# Patient Record
Sex: FEMALE | Race: ASIAN | NOT HISPANIC OR LATINO | URBAN - METROPOLITAN AREA
[De-identification: names, ages, dates, MRNs, and addresses within clinical notes are randomized per-mention and may not be internally consistent; named-entity substitution may affect disease eponyms.]

---

## 2021-07-19 ENCOUNTER — INPATIENT (INPATIENT)
Facility: HOSPITAL | Age: 21
LOS: 4 days | Discharge: HOME | End: 2021-07-24
Attending: HOSPITALIST | Admitting: HOSPITALIST
Payer: COMMERCIAL

## 2021-07-19 VITALS
DIASTOLIC BLOOD PRESSURE: 52 MMHG | TEMPERATURE: 98 F | RESPIRATION RATE: 18 BRPM | SYSTOLIC BLOOD PRESSURE: 102 MMHG | HEART RATE: 88 BPM | OXYGEN SATURATION: 100 %

## 2021-07-19 LAB
ALBUMIN SERPL ELPH-MCNC: 5.3 G/DL — HIGH (ref 3.3–5)
ALP SERPL-CCNC: 67 U/L — SIGNIFICANT CHANGE UP (ref 40–120)
ALT FLD-CCNC: 9 U/L — SIGNIFICANT CHANGE UP (ref 4–33)
ANION GAP SERPL CALC-SCNC: 19 MMOL/L — HIGH (ref 7–14)
AST SERPL-CCNC: 16 U/L — SIGNIFICANT CHANGE UP (ref 4–32)
BASOPHILS # BLD AUTO: 0.01 K/UL — SIGNIFICANT CHANGE UP (ref 0–0.2)
BASOPHILS NFR BLD AUTO: 0.1 % — SIGNIFICANT CHANGE UP (ref 0–2)
BILIRUB SERPL-MCNC: 0.5 MG/DL — SIGNIFICANT CHANGE UP (ref 0.2–1.2)
BUN SERPL-MCNC: 11 MG/DL — SIGNIFICANT CHANGE UP (ref 7–23)
CALCIUM SERPL-MCNC: 10.1 MG/DL — SIGNIFICANT CHANGE UP (ref 8.4–10.5)
CHLORIDE SERPL-SCNC: 100 MMOL/L — SIGNIFICANT CHANGE UP (ref 98–107)
CO2 SERPL-SCNC: 23 MMOL/L — SIGNIFICANT CHANGE UP (ref 22–31)
CREAT SERPL-MCNC: 0.58 MG/DL — SIGNIFICANT CHANGE UP (ref 0.5–1.3)
EOSINOPHIL # BLD AUTO: 0.02 K/UL — SIGNIFICANT CHANGE UP (ref 0–0.5)
EOSINOPHIL NFR BLD AUTO: 0.3 % — SIGNIFICANT CHANGE UP (ref 0–6)
GLUCOSE SERPL-MCNC: 95 MG/DL — SIGNIFICANT CHANGE UP (ref 70–99)
HCT VFR BLD CALC: 41.6 % — SIGNIFICANT CHANGE UP (ref 34.5–45)
HGB BLD-MCNC: 12.7 G/DL — SIGNIFICANT CHANGE UP (ref 11.5–15.5)
IANC: 3.96 K/UL — SIGNIFICANT CHANGE UP (ref 1.5–8.5)
IMM GRANULOCYTES NFR BLD AUTO: 0.3 % — SIGNIFICANT CHANGE UP (ref 0–1.5)
LYMPHOCYTES # BLD AUTO: 2.79 K/UL — SIGNIFICANT CHANGE UP (ref 1–3.3)
LYMPHOCYTES # BLD AUTO: 37.7 % — SIGNIFICANT CHANGE UP (ref 13–44)
MCHC RBC-ENTMCNC: 27.5 PG — SIGNIFICANT CHANGE UP (ref 27–34)
MCHC RBC-ENTMCNC: 30.5 GM/DL — LOW (ref 32–36)
MCV RBC AUTO: 90.2 FL — SIGNIFICANT CHANGE UP (ref 80–100)
MONOCYTES # BLD AUTO: 0.6 K/UL — SIGNIFICANT CHANGE UP (ref 0–0.9)
MONOCYTES NFR BLD AUTO: 8.1 % — SIGNIFICANT CHANGE UP (ref 2–14)
NEUTROPHILS # BLD AUTO: 3.96 K/UL — SIGNIFICANT CHANGE UP (ref 1.8–7.4)
NEUTROPHILS NFR BLD AUTO: 53.5 % — SIGNIFICANT CHANGE UP (ref 43–77)
NRBC # BLD: 0 /100 WBCS — SIGNIFICANT CHANGE UP
NRBC # FLD: 0 K/UL — SIGNIFICANT CHANGE UP
PLATELET # BLD AUTO: 267 K/UL — SIGNIFICANT CHANGE UP (ref 150–400)
POTASSIUM SERPL-MCNC: 3.9 MMOL/L — SIGNIFICANT CHANGE UP (ref 3.5–5.3)
POTASSIUM SERPL-SCNC: 3.9 MMOL/L — SIGNIFICANT CHANGE UP (ref 3.5–5.3)
PROT SERPL-MCNC: 8.2 G/DL — SIGNIFICANT CHANGE UP (ref 6–8.3)
RBC # BLD: 4.61 M/UL — SIGNIFICANT CHANGE UP (ref 3.8–5.2)
RBC # FLD: 11.1 % — SIGNIFICANT CHANGE UP (ref 10.3–14.5)
SODIUM SERPL-SCNC: 142 MMOL/L — SIGNIFICANT CHANGE UP (ref 135–145)
WBC # BLD: 7.4 K/UL — SIGNIFICANT CHANGE UP (ref 3.8–10.5)
WBC # FLD AUTO: 7.4 K/UL — SIGNIFICANT CHANGE UP (ref 3.8–10.5)

## 2021-07-19 NOTE — ED PROVIDER NOTE - CLINICAL SUMMARY MEDICAL DECISION MAKING FREE TEXT BOX
21 y F w/ no PMHx presenting with inability to walk straight and lightheadedness for 3 days. +lower extremity weakness, lethargy, numbness of her finger tips. Denies room spinning, vomiting, blurry vision, headache, bowel/bladder incontinence. Vital signs stable. Physical exam positive for ataxic gait, mild dysmetria on finger to nose testing. DDX: Unlikely mass effect as patient has no signs of increased ICP (headache, vomiting, blurry vision). Post infectious (acute cerebellar ataxia, GBS, transverse myelitis) vs demyelinating disorder (MS) vs spinocerebellar ataxia vs POTS. Plan: non-con head CT, CBC, CMP, consult neurology -likely admission.

## 2021-07-19 NOTE — ED PROVIDER NOTE - NS ED ROS FT
Constitutional: No fever. no chills. no unexpected weight loss/gain  Eyes: No visual changes, eye pain or redness  HEENT: No throat pain, hearing changes, ear pain, nasal pain. No nose bleeding.  CV: No chest pain, no palpitations  Resp: No shortness of breath, no cough  GI: No abdominal pain. No nausea. no  vomiting. No diarrhea. No constipation.   : No dysuria, hematuria. no urinary frequency, no urinary urgency.  MSK: No musculoskeletal pain  Skin: No rash, lesions, no bruises  Neuro: No headache. + numbness or tingling. + weakness. No dizziness.  Allergy/Immunology: no allergy to medicine

## 2021-07-19 NOTE — ED PROVIDER NOTE - PROGRESS NOTE DETAILS
Mary-PGY3: pt received at sign-out, seen and evaluated at bedside.  Pt sitting comfortably in NAD. Discussed R/B/A of LP to evaluate for GBS/demyelinating disease, pt agreeable. LP performed without complication (see procedure note). Discussed with hospitalist, accepted for admission. Text page was sent to the MAR to inform them of the patient's admission. My call back number was included for any follow up questions.

## 2021-07-19 NOTE — ED ADULT NURSE NOTE - OBJECTIVE STATEMENT
Pt received in INT3. C/o dizziness since Saturday. States she woke up feeling something is off with weakness on b/l lower extremities. States currently dizziness have subsided but still feels her legs will give out once she starts walking. Denies PMH. A&ox4, ambulatory at baseline. Breathing even and unlabored. No facial droop or slurred speech noted. Pt has equal strength, motor, and sensation to bilateral upper and lower extremities. No drifts noted to bilateral upper and lower extremities. Denies nausea, vomiting or vision changes. 20G IV placed on left AC. Labs drawn and sent. Neuro at bedside for assessment. Waiting for CT. Will continue to monitor.

## 2021-07-19 NOTE — ED ADULT TRIAGE NOTE - CHIEF COMPLAINT QUOTE
Pt states she has been dizzy since Saturday. States she feels unsteady due to the dizziness. Denies PMH. States she went to urgent care who sent pt to the ED.

## 2021-07-19 NOTE — ED PROVIDER NOTE - OBJECTIVE STATEMENT
21 y F with no PMHx presenting with lightheadedness, fatigue and inability to walk for 3 days. Pt feels lightheadedness that is worse with standing. Denies room spinning sensation. Feels she can't walk in a straight line. No headache, trauma, LOC, vomiting, blurry vision or pain with eye movement. Endorses weakness in all extremities lower extremities > upper extremities. Numbness in her fingertips. No paresthesias.  No alcohol or nitrous oxide use. 21 y F with no PMHx presenting with lightheadedness, fatigue and inability to walk for 3 days. Pt feels lightheadedness that is worse with standing. Denies room spinning sensation. Feels she can't walk in a straight line. Numbness in her fingertips. No headache, trauma, LOC, vomiting, paresthesias, bowel/bladder incontinence, blurry vision or pain with eye movement. Endorses weakness in all extremities lower extremities > upper extremities. Drinks alcohol once a month, no nitrous oxide use. No recent illnesses. Covid vaccine in May.

## 2021-07-19 NOTE — ED PROVIDER NOTE - ATTENDING CONTRIBUTION TO CARE
20yo f no past medical history presents with difficulty walking, lightheadedness, fatigue, reported decreased sensation at finger/toe tips x 3 days. also reports legs  feel weaker relative to armsreports dry cough several weeks ago but otherwise no recent health issues. received 2nd covid vaccine in early may, no other recent meds/vaccination. no recent travel. no sexual activity in>1 yr and denies STD hx. denies drugs,etoh, tobacco, thc. reports normal BMs and urination.  denies trauma. exam as above.

## 2021-07-20 DIAGNOSIS — Z29.9 ENCOUNTER FOR PROPHYLACTIC MEASURES, UNSPECIFIED: ICD-10-CM

## 2021-07-20 DIAGNOSIS — R27.0 ATAXIA, UNSPECIFIED: ICD-10-CM

## 2021-07-20 LAB
ANION GAP SERPL CALC-SCNC: 15 MMOL/L — HIGH (ref 7–14)
APPEARANCE CSF: CLEAR — SIGNIFICANT CHANGE UP
APPEARANCE SPUN FLD: COLORLESS — SIGNIFICANT CHANGE UP
B PERT DNA SPEC QL NAA+PROBE: SIGNIFICANT CHANGE UP
BACTERIAL AG PNL SER: 0 % — SIGNIFICANT CHANGE UP
BASE EXCESS BLDV CALC-SCNC: 3.8 MMOL/L — HIGH (ref -3–2)
BLOOD GAS VENOUS - CREATININE: 0.6 MG/DL — SIGNIFICANT CHANGE UP (ref 0.5–1.3)
BLOOD GAS VENOUS COMPREHENSIVE RESULT: SIGNIFICANT CHANGE UP
BUN SERPL-MCNC: 11 MG/DL — SIGNIFICANT CHANGE UP (ref 7–23)
C PNEUM DNA SPEC QL NAA+PROBE: SIGNIFICANT CHANGE UP
CALCIUM SERPL-MCNC: 9.6 MG/DL — SIGNIFICANT CHANGE UP (ref 8.4–10.5)
CHLORIDE BLDV-SCNC: 105 MMOL/L — SIGNIFICANT CHANGE UP (ref 96–108)
CHLORIDE SERPL-SCNC: 100 MMOL/L — SIGNIFICANT CHANGE UP (ref 98–107)
CO2 SERPL-SCNC: 23 MMOL/L — SIGNIFICANT CHANGE UP (ref 22–31)
COLOR CSF: COLORLESS — SIGNIFICANT CHANGE UP
CREAT SERPL-MCNC: 0.6 MG/DL — SIGNIFICANT CHANGE UP (ref 0.5–1.3)
CSF PCR RESULT: SIGNIFICANT CHANGE UP
EOSINOPHIL # CSF: 2 % — SIGNIFICANT CHANGE UP
FLUAV SUBTYP SPEC NAA+PROBE: SIGNIFICANT CHANGE UP
FLUBV RNA SPEC QL NAA+PROBE: SIGNIFICANT CHANGE UP
GAS PNL BLDV: 140 MMOL/L — SIGNIFICANT CHANGE UP (ref 136–146)
GLUCOSE BLDV-MCNC: 90 MG/DL — SIGNIFICANT CHANGE UP (ref 70–99)
GLUCOSE CSF-MCNC: 59 MG/DL — SIGNIFICANT CHANGE UP (ref 40–70)
GLUCOSE SERPL-MCNC: 86 MG/DL — SIGNIFICANT CHANGE UP (ref 70–99)
GRAM STN FLD: SIGNIFICANT CHANGE UP
HADV DNA SPEC QL NAA+PROBE: SIGNIFICANT CHANGE UP
HCO3 BLDV-SCNC: 27 MMOL/L — SIGNIFICANT CHANGE UP (ref 20–27)
HCOV 229E RNA SPEC QL NAA+PROBE: SIGNIFICANT CHANGE UP
HCOV HKU1 RNA SPEC QL NAA+PROBE: SIGNIFICANT CHANGE UP
HCOV NL63 RNA SPEC QL NAA+PROBE: SIGNIFICANT CHANGE UP
HCOV OC43 RNA SPEC QL NAA+PROBE: SIGNIFICANT CHANGE UP
HCT VFR BLDA CALC: 35.7 % — SIGNIFICANT CHANGE UP (ref 34.5–46.5)
HGB BLD CALC-MCNC: 11.6 G/DL — SIGNIFICANT CHANGE UP (ref 11.5–15.5)
HMPV RNA SPEC QL NAA+PROBE: SIGNIFICANT CHANGE UP
HPIV1 RNA SPEC QL NAA+PROBE: SIGNIFICANT CHANGE UP
HPIV2 RNA SPEC QL NAA+PROBE: SIGNIFICANT CHANGE UP
HPIV3 RNA SPEC QL NAA+PROBE: SIGNIFICANT CHANGE UP
HPIV4 RNA SPEC QL NAA+PROBE: SIGNIFICANT CHANGE UP
LACTATE BLDV-MCNC: 1.5 MMOL/L — SIGNIFICANT CHANGE UP (ref 0.5–2)
LDH CSF L TO P-CCNC: 20 U/L — SIGNIFICANT CHANGE UP
LDH FLD-CCNC: 20 U/L — SIGNIFICANT CHANGE UP
LYMPHOCYTES # CSF: 74 % — SIGNIFICANT CHANGE UP
MONOS+MACROS NFR CSF: 18 % — SIGNIFICANT CHANGE UP
NEUTROPHILS # CSF: 6 % — SIGNIFICANT CHANGE UP
NRBC NFR CSF: 3 CELLS/UL — SIGNIFICANT CHANGE UP (ref 0–5)
OTHER CELLS CSF MANUAL: 0 % — SIGNIFICANT CHANGE UP
PCO2 BLDV: 50 MMHG — SIGNIFICANT CHANGE UP (ref 41–51)
PH BLDV: 7.38 — SIGNIFICANT CHANGE UP (ref 7.32–7.43)
PO2 BLDV: 38 MMHG — SIGNIFICANT CHANGE UP (ref 35–40)
POTASSIUM BLDV-SCNC: 4.3 MMOL/L — SIGNIFICANT CHANGE UP (ref 3.4–4.5)
POTASSIUM SERPL-MCNC: 3.9 MMOL/L — SIGNIFICANT CHANGE UP (ref 3.5–5.3)
POTASSIUM SERPL-SCNC: 3.9 MMOL/L — SIGNIFICANT CHANGE UP (ref 3.5–5.3)
PROT CSF-MCNC: 23 MG/DL — SIGNIFICANT CHANGE UP (ref 15–45)
RAPID RVP RESULT: SIGNIFICANT CHANGE UP
RBC # CSF: 1 CELLS/UL — HIGH (ref 0–0)
RSV RNA SPEC QL NAA+PROBE: SIGNIFICANT CHANGE UP
RV+EV RNA SPEC QL NAA+PROBE: SIGNIFICANT CHANGE UP
SAO2 % BLDV: 68.1 % — SIGNIFICANT CHANGE UP (ref 60–85)
SARS-COV-2 RNA SPEC QL NAA+PROBE: SIGNIFICANT CHANGE UP
SARS-COV-2 RNA SPEC QL NAA+PROBE: SIGNIFICANT CHANGE UP
SODIUM SERPL-SCNC: 138 MMOL/L — SIGNIFICANT CHANGE UP (ref 135–145)
SPECIMEN SOURCE: SIGNIFICANT CHANGE UP
TOTAL CELLS COUNTED, SPINAL FLUID: 50 CELLS — SIGNIFICANT CHANGE UP
TUBE TYPE: SIGNIFICANT CHANGE UP

## 2021-07-20 PROCEDURE — 99223 1ST HOSP IP/OBS HIGH 75: CPT

## 2021-07-20 PROCEDURE — 70450 CT HEAD/BRAIN W/O DYE: CPT | Mod: 26

## 2021-07-20 RX ORDER — DIPHENHYDRAMINE HCL 50 MG
25 CAPSULE ORAL EVERY 24 HOURS
Refills: 0 | Status: COMPLETED | OUTPATIENT
Start: 2021-07-20 | End: 2021-07-23

## 2021-07-20 RX ORDER — ACETAMINOPHEN 500 MG
650 TABLET ORAL EVERY 24 HOURS
Refills: 0 | Status: COMPLETED | OUTPATIENT
Start: 2021-07-20 | End: 2021-07-23

## 2021-07-20 RX ORDER — ENOXAPARIN SODIUM 100 MG/ML
40 INJECTION SUBCUTANEOUS DAILY
Refills: 0 | Status: DISCONTINUED | OUTPATIENT
Start: 2021-07-21 | End: 2021-07-24

## 2021-07-20 RX ORDER — IMMUNE GLOBULIN (HUMAN) 10 G/100ML
25 INJECTION INTRAVENOUS; SUBCUTANEOUS EVERY 24 HOURS
Refills: 0 | Status: COMPLETED | OUTPATIENT
Start: 2021-07-20 | End: 2021-07-23

## 2021-07-20 RX ADMIN — Medication 650 MILLIGRAM(S): at 13:42

## 2021-07-20 RX ADMIN — IMMUNE GLOBULIN (HUMAN) 62.5 GRAM(S): 10 INJECTION INTRAVENOUS; SUBCUTANEOUS at 14:36

## 2021-07-20 RX ADMIN — Medication 25 MILLIGRAM(S): at 13:42

## 2021-07-20 NOTE — H&P ADULT - NSHPOUTPATIENTPROVIDERS_GEN_ALL_CORE
Dr. Annika Arrington ([place], [facility])  Pharmacy: [facility] [phone] [facility]

## 2021-07-20 NOTE — CONSULT NOTE ADULT - ATTENDING COMMENTS
21 year RH female with no reported past medical history who is presenting with difficulty ambulating and LE weakness. On PE proximal LE weakness. 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

## 2021-07-20 NOTE — H&P ADULT - NSHPSOCIALHISTORY_GEN_ALL_CORE
Patient lives at home.  She is a student at [employer], currently doing an internship in Business/Finance.  Denies alcohol use, drugs, or tobacco products.

## 2021-07-20 NOTE — H&P ADULT - NSHPLABSRESULTS_GEN_ALL_CORE
12.7   7.40  )-----------( 267      ( 19 Jul 2021 23:13 )             41.6   07-19    142  |  100  |  11  ----------------------------<  95  3.9   |  23  |  0.58    Ca    10.1      19 Jul 2021 23:13    TPro  8.2  /  Alb  5.3<H>  /  TBili  0.5  /  DBili  x   /  AST  16  /  ALT  9   /  AlkPhos  67  07-19    CSF Protein 23, Glucose 59    Covid 19 PCR negative    CSF cell count: Total nucleated cell count = 3, 1 RBC, 2% eos, 74% lymphocytes, Total cell count 50, segmented neutrophils 6%, monocytes 18%    CT Head: No CT evidence of acute intracranial hemorrhage, brain edema, or mass effect.    EKG: Sinus rhythm with sinus arrhythmia @ 66bpm, QTc 406, no acute ischemic changes

## 2021-07-20 NOTE — H&P ADULT - ATTENDING COMMENTS
21 y.o. F with no significant PMH who presents with lightheadedness, fatigue, and unsteady gait. Patient with reduced strength 4/5 in hip flexors b/l. No fevers, chills, diarrhea. Patient does report dry cough x 2- 3 weeks -- will obtain RVP. No sick contacts or recent travel. Neuro evaluated in ED; concerned for GBS. LP obtained and  not consistent with albuminocytologic dissociation; however, neuro believes it is early in the course and hence why CSF studies do not yet demonstrate typical findings. Will initiate IVIG as per neuro.

## 2021-07-20 NOTE — H&P ADULT - MENTAL STATUS
Patient alert and oriented, awake. Normal affect. Follows all commands.   speech is fluent and clear

## 2021-07-20 NOTE — H&P ADULT - NEGATIVE NEUROLOGICAL SYMPTOMS
no transient paralysis/no generalized seizures/no focal seizures/no syncope/no tremors/no vertigo/no headache/no loss of consciousness/no hemiparesis/no confusion/no facial palsy

## 2021-07-20 NOTE — H&P ADULT - HISTORY OF PRESENT ILLNESS
21F no PMHx presenting with lightheadedness, fatigue, and unsteady gait for the past 3 days (since Saturday). Patient states she was was in her usual state of health and felt fine on Friday. When she woke up Saturday, she noticed she felt very tired, lethargic and dizzy when she stood up. It was difficult for her to walk straight without holding onto something for support. Her symptoms became worse yesterday, specifically with reduced sensation and strength in her lower extremities. She endorses sensation of numbness in her finger tips as well, and feels as though her arms are weaker than normal, but not as weak as her legs. She states that when she walks her dad has to hold her up and she sways back and forth and she feel as though she does not have the strength in her legs to support her weight. She noticed her legs shaking slightly yesterday. She admits to decreased appetite since yesterday as well with decreased PO intake. She denies changes in her hearing or vision, headache, nausea, vomiting, bowel or bladder incontinence. She denies CP, SOB, abdominal pain, constipation, diarrhea, difficulty urinating, dysuria, fever, chills, nasal congestion. She does endorse dry cough worse in the morning time x 2 weeks as well as sensation of abdominal bloating yesterday that has since resolved. In the ED, no meds were given. She was evaluated by neurology and LP was performed. Vital signs were stable.

## 2021-07-20 NOTE — CONSULT NOTE ADULT - SUBJECTIVE AND OBJECTIVE BOX
HPI:  Kylee Aldridge is a 21 year RH female with no reported past medical history who is presenting with difficulty ambulating and LE weakness. At baseline, patient ambulates without any assistive devices and is oriented to person/place/time. Patient stated that 2 days prior she started to have some LE weakness and difficulty walking. She reported lightheadedness when she stood up and difficulty walking without needing the assistance of holding on to items around her. She stated that she has not improved over the last 48 hours and has now presented to the ED for further evaluation. She endorsed that she felt that her thighs were what felt the weakest when she tried to stand up and also noted some changes in sensation to the tips of her fingertips. Denied any falls or head trauma. Denied headaches, vertigo, visual changes, diplopia, dysphagia, dysarthria, auditory changes. Denies any recent life stressors but notes she is stressed because she is concerned    REVIEW OF SYSTEMS    A 10-system ROS was performed and is negative except for those items noted above and/or in the HPI.    PAST MEDICAL & SURGICAL HISTORY:    FAMILY HISTORY:    SOCIAL HISTORY:   T/E/D:   Occupation:   Lives with:     MEDICATIONS (HOME):  Home Medications:    MEDICATIONS  (STANDING):    MEDICATIONS  (PRN):    ALLERGIES/INTOLERANCES:  Allergies  No Known Allergies    Intolerances    VITALS & EXAMINATION:  Vital Signs Last 24 Hrs  T(C): 36.5 (19 Jul 2021 19:26), Max: 36.5 (19 Jul 2021 19:26)  T(F): 97.7 (19 Jul 2021 19:26), Max: 97.7 (19 Jul 2021 19:26)  HR: 88 (19 Jul 2021 19:26) (88 - 88)  BP: 102/52 (19 Jul 2021 19:26) (102/52 - 102/52)  BP(mean): --  RR: 18 (19 Jul 2021 19:26) (18 - 18)  SpO2: 100% (19 Jul 2021 19:26) (100% - 100%)    General:  Constitutional: Obese Female, appears stated age, in no apparent distress including pain  Head: Normocephalic & atraumatic.  ENT: Patent ear canals, intact TM, mucus membranes moist & pink, neck supple, no lymphadenopathy.   Respiratory: Patent airway. All lung fields are clear to auscultation bilaterally.  Extremities: No cyanosis, clubbing, or edema.  Skin: No rashes, bruising, or discoloration.    Cardiovascular (>2): RRR no murmurs. Carotid pulsations symmetric, no bruits. Normal capillary beds refill, 1-2 seconds or less.     Neurological (>12):  MS: Awake, alert, oriented to person, place, situation, time. Normal affect. Follows all commands.    Language: Speech is clear, fluent with good repetition & comprehension (able to name objects___)    CNs: PERRLA (R = 3mm, L = 3mm). VFF. EOMI no nystagmus, no diplopia. V1-3 intact to LT/pinprick, well developed masseter muscles b/l. No facial asymmetry b/l, full eye closure strength b/l. Hearing grossly normal (rubbing fingers) b/l. Symmetric palate elevation in midline. Gag reflex deferred. Head turning & shoulder shrug intact b/l. Tongue midline, normal movements, no atrophy.    Fundoscopic: pale w/ sharp discs margins No vascular changes.      Motor: Normal muscle bulk & tone. No noticeable tremor or seizure. No pronator drift.              Deltoid	Biceps	Triceps	Wrist	Finger ABd	   R	5	5	5	5	5		5 	  L	5	5	5	5	5		5    	H-Flex	H-Ext	H-ABd	H-ADd	K-Flex	K-Ext	D-Flex	P-Flex  R	5	5	5	5	5	5	5	5 	   L	5	5	5	5	5	5	5	5	     Sensation: Intact to LT/PP/Temp/Vibration/Position b/l throughout.     Cortical: Extinction on DSS (neglect): none    Reflexes:              Biceps(C5)       BR(C6)     Triceps(C7)               Patellar(L4)    Achilles(S1)    Plantar Resp  R	2	          2	             2		        2		    2		Down   L	2	          2	             2		        2		    2		Down     Coordination: intact rapid-alt movements. No dysmetria to FTN/HTS    Gait: Normal Romberg. No postural instability. Normal stance and tandem gait.     LABORATORY:  CBC                       12.7   7.40  )-----------( 267      ( 19 Jul 2021 23:13 )             41.6     Chem 07-19    142  |  100  |  11  ----------------------------<  95  3.9   |  23  |  0.58    Ca    10.1      19 Jul 2021 23:13    TPro  8.2  /  Alb  5.3<H>  /  TBili  0.5  /  DBili  x   /  AST  16  /  ALT  9   /  AlkPhos  67  07-19    LFTs LIVER FUNCTIONS - ( 19 Jul 2021 23:13 )  Alb: 5.3 g/dL / Pro: 8.2 g/dL / ALK PHOS: 67 U/L / ALT: 9 U/L / AST: 16 U/L / GGT: x           Coagulopathy   Lipid Panel   A1c   Cardiac enzymes     U/A   CSF  Immunological  Other    STUDIES & IMAGING:  Studies (EKG, EEG, EMG, etc):     Radiology (XR, CT, MR, U/S, TTE/NASIMA): HPI:  Kylee Aldridge is a 21 year RH female with no reported past medical history who is presenting with difficulty ambulating and LE weakness. At baseline, patient ambulates without any assistive devices and is oriented to person/place/time. Patient stated that 2 days prior she started to have some LE weakness and difficulty walking. She reported lightheadedness when she stood up and difficulty walking without needing the assistance of holding on to items around her. She stated that she has not improved over the last 48 hours and has now presented to the ED for further evaluation. She endorsed that she felt that her thighs were she felt the weakest when she tried to stand up and felt like they were shaking and also noted some changes in sensation to the tips of her fingertips. Denied any falls or head trauma. Denied headaches, vertigo, visual changes, diplopia, dysphagia, dysarthria, auditory changes. 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  No recent vaccinations, had last COVID vaccine at the end of May. States that she recently had a cough for about a week but denies productive cough, fevers, chills, sore throat, diarrhea.     REVIEW OF SYSTEMS    A 10-system ROS was performed and is negative except for those items noted above and/or in the HPI.    PAST MEDICAL & SURGICAL HISTORY:  No reported PMHx    FAMILY HISTORY:  No reported family history of neurological disorders.     SOCIAL HISTORY:   T/E/D: denies tobacco, illicit drugs. States has about 1 drink per month  Occupation: student  Lives with: family    ALLERGIES/INTOLERANCES:  Allergies  No Known Allergies    Intolerances    VITALS & EXAMINATION:  Vital Signs Last 24 Hrs  T(C): 36.5 (19 Jul 2021 19:26), Max: 36.5 (19 Jul 2021 19:26)  T(F): 97.7 (19 Jul 2021 19:26), Max: 97.7 (19 Jul 2021 19:26)  HR: 88 (19 Jul 2021 19:26) (88 - 88)  BP: 102/52 (19 Jul 2021 19:26) (102/52 - 102/52)  BP(mean): --  RR: 18 (19 Jul 2021 19:26) (18 - 18)  SpO2: 100% (19 Jul 2021 19:26) (100% - 100%)    General:  Constitutional: Appears stated age, very anxious and tearful.  Head: Normocephalic & atraumatic.    Neurological (>12):  MS: Awake, alert, oriented to person, place, situation, time. Normal affect. Follows all commands.    Language: Speech is clear, fluent with good repetition & comprehension. Able to count to 20 in one breath.     CNs: PERRLA (R = 3mm, L = 3mm). VF intact in all 4 quadrants. EOMI with brief fatigable nystagmus on R gaze. V1-3 intact to LT, well developed masseter muscles b/l. No facial asymmetry b/l, full eye closure strength b/l. Symmetric palate elevation in midline.  Shoulder shrug intact b/l. Tongue midline, normal movements, no atrophy.  No Cogan's lid twitch.     Motor: Normal muscle bulk & tone. No noticeable tremor or seizure. No pronator drift.               Deltoid	Biceps	Triceps	Wrist	   R	5	5	5	5	5			  L	5	5	5	5	5	    	H-Flex	H-Ext	K-Flex	K-Ext	D-Flex	P-Flex  R	4+	5	5	5	5	5		   L	4+	5	5	5	5	5		     Sensation: Intact to LT/PP/Vibration/Position b/l throughout. Does note subjective decreased sensation on the thighs bilaterally.     Reflexes:              Biceps(C5)       BR(C6)     Triceps(C7)               Patellar(L4)    Achilles(S1)      R	0	          0	             0		        0		    0		   L	0	          0	             0		        0		    0		    Coordination: intact rapid-alt movements. No overt dysmetria to FTN    Gait: Normal Romberg. Unstable when walking without overt ataxia. Very tearful when attempts to walk and requires to hold to items when walking. Takes 4-5 steps when turning.     LABORATORY:  CBC                       12.7   7.40  )-----------( 267      ( 19 Jul 2021 23:13 )             41.6     Chem 07-19    142  |  100  |  11  ----------------------------<  95  3.9   |  23  |  0.58    Ca    10.1      19 Jul 2021 23:13    TPro  8.2  /  Alb  5.3<H>  /  TBili  0.5  /  DBili  x   /  AST  16  /  ALT  9   /  AlkPhos  67  07-19    LFTs LIVER FUNCTIONS - ( 19 Jul 2021 23:13 )  Alb: 5.3 g/dL / Pro: 8.2 g/dL / ALK PHOS: 67 U/L / ALT: 9 U/L / AST: 16 U/L / GGT: x           STUDIES & IMAGING:    Radiology (XR, CT, MR, U/S, TTE/NASIMA):

## 2021-07-20 NOTE — H&P ADULT - PROBLEM SELECTOR PLAN 1
- s/p LP in ED with cell count not consistent with bacterial/infectious process  - no recent preceding signs or symptoms of acute infection, GI upset, or gastrointestinal illness by history   - Head CT negative for acute findings  - Neurology consultation appreciated  - Areflexia, LE weakness, subtle gait instability concerning for acute inflammatory demyelinating polyneuropathy such as Guillain-Barré syndrome, possible variant of Corado-Quan Syndrome   - Possible that myopathy in differential as well given proximal muscle weakness   - CSF protein 23 (normal) - discussed with neurology - not consistent with albuminocytologic dissociation  - Lyme studies pending (lab will try to add on tests as there is limited amount of specimen obtained).  - Oligoclonal bands added on however not enough specimen received per my discussion with   - f/u ganglioside antibodies and Gq1b (in process)  - Check MRI brain without contrast (expedited with MRI dept this AM)  - I discussed with neurology team this morning - they will round with attending and decide on starting treatment with IVIG today - will follow up recommendations.  - Check orthostatics given component of light headedness on standing - autonomic component?   - Check UTox, UA, b12, folate   - Bedrest for now given unsteady gait   - Monitor vital signs and pulse ox closely, frequent neuro checks, fall prevention precautions - s/p LP in ED with cell count not consistent with bacterial/infectious process  - no recent preceding signs or symptoms of acute infection, GI upset, or gastrointestinal illness by history   - Head CT negative for acute findings  - Neurology consultation appreciated  - Areflexia, LE weakness, subtle gait instability concerning for acute inflammatory demyelinating polyneuropathy such as Guillain-Barré syndrome, possible variant of Corado-Quan Syndrome   - CSF protein 23 (normal) - discussed with neurology - not consistent with albuminocytologic dissociation  - Lyme studies pending (lab will try to add on tests as there is limited amount of specimen obtained).  - Oligoclonal bands added on to CSF studies   - f/u ganglioside antibodies and Gq1b (in process)  - Check MRI brain without contrast (expedited with MRI dept this AM) as well as MRI LS spine w/contrast per neuro  - Per my discussion with neuro - neuro recommends starting treatment with IVIG 2g/kg over 4 days preceded by pre-medication with Tylenol 325mg and Benadryl 25mg po 30 minutes prior to administration.  - I discussed this treatment plan with patient and her father at bedside who are in agreement to proceed with treatment; I also discussed with dispensing pharmacy and neuro team who state that no consent is needed   - NIF q6 ordered for close monitoring as it is extremely important for pt with GBS to have monitor for sudden changes in respiratory status   - Check orthostatics given component of light headedness on standing - autonomic component?   - Check UTox, UA, b12, folate   - Bedrest for now given unsteady gait   - Monitor vital signs and pulse ox closely, frequent neuro checks, fall prevention precautions - s/p LP in ED with cell count not consistent with bacterial/infectious process  - no recent preceding signs or symptoms of acute infection, GI upset, or gastrointestinal illness by history   - Head CT negative for acute findings  - Neurology consultation appreciated  - Areflexia, LE weakness, subtle gait instability concerning for acute inflammatory demyelinating polyneuropathy such as Guillain-Barré syndrome, possible variant of Corado-Quan Syndrome   - CSF protein 23 (normal) - discussed with neurology - not consistent with albuminocytologic dissociation  - Lyme studies pending (lab will try to add on tests as there is limited amount of specimen obtained).  - Oligoclonal bands added on to CSF studies   - f/u ganglioside antibodies and Gq1b (in process)  - Check MRI brain without contrast (expedited with MRI dept this AM) as well as MRI LS spine w/contrast per neuro  - Per my discussion with neuro - neuro recommends starting treatment with IVIG 2g/kg over 4 days preceded by pre-medication with Tylenol 650mg and Benadryl 25mg po 30 minutes prior to administration.  - I discussed this treatment plan with patient and her father at bedside who are in agreement to proceed with treatment; I also discussed with dispensing pharmacy and neuro team who state that no consent is needed   - Dosing of IVIG order confirmed with pharmacy and neuro team   - NIF q6 ordered for close monitoring as it is extremely important for pt with GBS to have monitor for sudden changes in respiratory status   - Check orthostatics given component of light headedness on standing - autonomic component?   - Check UTox, UA, b12, folate   - Bedrest for now given unsteady gait   - Monitor vital signs and pulse ox closely, frequent neuro checks, fall prevention precautions

## 2021-07-20 NOTE — ED ADULT NURSE REASSESSMENT NOTE - NS ED NURSE REASSESS COMMENT FT1
Pt resting comfortably in bed, denies pain/ symptoms at this time, vital signs stable. No neuro deficit observed, no facial asymmetry, equal  strength bilaterally, no arm/leg drift, positive strength to all four extremities, and positive sensation, PERRLA.  awaiting bed, will reassess.

## 2021-07-20 NOTE — CONSULT NOTE ADULT - ASSESSMENT
Kylee Aldridge is a 21 year RH female with no reported past medical history who is presenting with difficulty ambulating and LE weakness.     Impression: areflexia, LE weakness, subtle gait instability concerning for an acute inflammatory demyelinating polyneuropathy, possible variant of Corado-Quan as symptoms are predominantly areflexia and ataxia but without an overt ophthalmoplegia. Can also consider a myopathy as she is complaining of predominantly proximal muscle problems but areflexia is unusual. Unlikely to be a myasthenia gravis presentation as patient without fatigable symptoms and areflexia would be unusual or MS, transverse myelitis as patient without upper motor neuron signs.     Recs:  [] head CT to rule out a cerebellar lesion  [] LP to test for glucose, protein, cell count, PCR, culture, gram stain, Lyme, oligoclonal bands  [] please send Ganglioside antibodies, Gq1b  [] MRI brain w/o contrast  [] will likely need IVIG 2g/kg over 5 days preceded by pre-medication with Tylenol 325mg and Benadryl 25mg 30 minutes prior to administration  [] PT/OT  [] fall precautions    Case to be discussed with neurology attending, Dr. King.  Kylee Aldridge is a 21 year RH female with no reported past medical history who is presenting with difficulty ambulating and LE weakness.     Impression: areflexia, LE weakness, subtle gait instability concerning for an acute inflammatory demyelinating polyneuropathy, possible variant of Corado-Quan as symptoms are predominantly areflexia and ataxia but without an overt ophthalmoplegia. Can also consider a myopathy as she is complaining of predominantly proximal muscle problems but areflexia is unusual. Unlikely to be a myasthenia gravis presentation as patient without fatigable symptoms and areflexia would be unusual or MS, transverse myelitis as patient without upper motor neuron signs.     Recs:  [] head CT to rule out a cerebellar lesion  [] LP to test for glucose, protein, cell count, PCR, culture, gram stain, Lyme, oligoclonal bands. Specifically checking for albuminocytologic dissociation.   [] please send Ganglioside antibodies, Gq1b  [] MRI brain w/o contrast  [] will likely need IVIG 2g/kg over 5 days preceded by pre-medication with Tylenol 325mg and Benadryl 25mg 30 minutes prior to administration  [] PT/OT  [] fall precautions    Case to be discussed with neurology attending, Dr. King.  Kylee Aldridge is a 21 year RH female with no reported past medical history who is presenting with difficulty ambulating and LE weakness.     Impression: areflexia, LE weakness, subtle gait instability concerning for an acute inflammatory demyelinating polyneuropathy, possible variant of Corado-Quan as symptoms are predominantly areflexia and ataxia but without an overt ophthalmoplegia. Can also consider a myopathy as she is complaining of predominantly proximal muscle problems but areflexia is unusual. Unlikely to be a myasthenia gravis presentation as patient without fatigable symptoms and areflexia would be unusual or MS, transverse myelitis as patient without upper motor neuron signs.     Recs:  [] head CT to rule out a cerebellar lesion  [] LP to test for glucose, protein, cell count, PCR, culture, gram stain, Lyme, oligoclonal bands. Specifically checking for albuminocytologic dissociation.   [] please send Ganglioside antibodies, Gq1b  [] MRI brain w/o contrast  [] will likely need IVIG 2g/kg over 5 days preceded by pre-medication with Tylenol 325mg and Benadryl 25mg 30 minutes prior to administration  [] please obtain NIF q6 as it is extremely important for pt with GBS to have monitor for sudden changes in respiratory status   [] PT/OT  [] fall precautions    Case to be discussed with neurology attending, Dr. King.  Kylee Aldridge is a 21 year RH female with no reported past medical history who is presenting with difficulty ambulating and LE weakness.     Impression: areflexia, LE weakness, subtle gait instability concerning for an acute inflammatory demyelinating polyneuropathy, possible variant of Corado-Quan as symptoms are predominantly areflexia and ataxia but without an overt ophthalmoplegia. Can also consider a myopathy as she is complaining of predominantly proximal muscle problems but areflexia is unusual. Unlikely to be a myasthenia gravis presentation as patient without fatigable symptoms and areflexia would be unusual or MS, transverse myelitis as patient without upper motor neuron signs.     Recs:  [] head CT to rule out a cerebellar lesion  [] LP to test for glucose, protein, cell count, PCR, culture, gram stain, Lyme, oligoclonal bands. Specifically checking for albuminocytologic dissociation.   [] please send Ganglioside antibodies, Gq1b  [] MRI brain w/o contrast  [] will likely need IVIG 2g/kg over 4 days ( 2*47.6 over 4 days = 24 g/day) preceded by pre-medication with Tylenol 325mg and Benadryl 25mg po 30 minutes prior to administration  [] please obtain NIF q6 as it is extremely important for pt with GBS to have monitor for sudden changes in respiratory status   [] PT/OT  [] fall precautions    Case to be discussed with neurology attending, Dr. King.  Kylee Aldridge is a 21 year RH female with no reported past medical history who is presenting with difficulty ambulating and LE weakness.     Impression: areflexia, LE weakness, subtle gait instability concerning for an acute inflammatory demyelinating polyneuropathy, possible variant of Corado-Quan as symptoms are predominantly areflexia and ataxia but without an overt ophthalmoplegia. Can also consider a myopathy as she is complaining of predominantly proximal muscle problems but areflexia is unusual. Unlikely to be a myasthenia gravis presentation as patient without fatigable symptoms and areflexia would be unusual or MS, transverse myelitis as patient without upper motor neuron signs.     Recs:  [] head CT to rule out a cerebellar lesion  [] LP to test for glucose, protein, cell count, PCR, culture, gram stain, Lyme, oligoclonal bands. Specifically checking for albuminocytologic dissociation.   [] please send Ganglioside antibodies, Gq1b  [] will likely need IVIG 2g/kg over 4 days  preceded by pre-medication with Tylenol 325mg and Benadryl 25mg po 30 minutes prior to administration  [] please obtain NIF q6 as it is extremely important for pt with GBS to have monitor for sudden changes in respiratory status   [] PT/OT  [] fall precautions    Case to be discussed with neurology attending, Dr. King.

## 2021-07-20 NOTE — H&P ADULT - PROBLEM SELECTOR PLAN 2
- DVT ppx - SCDs for now given bedrest and recent lumbar puncture   - Case and plan to be discussed with attending - DVT ppx - SCDs for now given bedrest and recent lumbar puncture   - Case and plan discussed with Dr. Hearn

## 2021-07-21 LAB
ANION GAP SERPL CALC-SCNC: 15 MMOL/L — HIGH (ref 7–14)
BUN SERPL-MCNC: 12 MG/DL — SIGNIFICANT CHANGE UP (ref 7–23)
CALCIUM SERPL-MCNC: 9.7 MG/DL — SIGNIFICANT CHANGE UP (ref 8.4–10.5)
CHLORIDE SERPL-SCNC: 100 MMOL/L — SIGNIFICANT CHANGE UP (ref 98–107)
CO2 SERPL-SCNC: 23 MMOL/L — SIGNIFICANT CHANGE UP (ref 22–31)
COVID-19 SPIKE DOMAIN AB INTERP: POSITIVE
COVID-19 SPIKE DOMAIN ANTIBODY RESULT: >250 U/ML — HIGH
CREAT SERPL-MCNC: 0.6 MG/DL — SIGNIFICANT CHANGE UP (ref 0.5–1.3)
GLUCOSE SERPL-MCNC: 106 MG/DL — HIGH (ref 70–99)
HCT VFR BLD CALC: 40.2 % — SIGNIFICANT CHANGE UP (ref 34.5–45)
HGB BLD-MCNC: 12.2 G/DL — SIGNIFICANT CHANGE UP (ref 11.5–15.5)
MAGNESIUM SERPL-MCNC: 2.3 MG/DL — SIGNIFICANT CHANGE UP (ref 1.6–2.6)
MCHC RBC-ENTMCNC: 27.9 PG — SIGNIFICANT CHANGE UP (ref 27–34)
MCHC RBC-ENTMCNC: 30.3 GM/DL — LOW (ref 32–36)
MCV RBC AUTO: 91.8 FL — SIGNIFICANT CHANGE UP (ref 80–100)
NRBC # BLD: 0 /100 WBCS — SIGNIFICANT CHANGE UP
NRBC # FLD: 0 K/UL — SIGNIFICANT CHANGE UP
PHOSPHATE SERPL-MCNC: 4.7 MG/DL — HIGH (ref 2.5–4.5)
PLATELET # BLD AUTO: 216 K/UL — SIGNIFICANT CHANGE UP (ref 150–400)
POTASSIUM SERPL-MCNC: 4.2 MMOL/L — SIGNIFICANT CHANGE UP (ref 3.5–5.3)
POTASSIUM SERPL-SCNC: 4.2 MMOL/L — SIGNIFICANT CHANGE UP (ref 3.5–5.3)
RBC # BLD: 4.38 M/UL — SIGNIFICANT CHANGE UP (ref 3.8–5.2)
RBC # FLD: 11 % — SIGNIFICANT CHANGE UP (ref 10.3–14.5)
SARS-COV-2 IGG+IGM SERPL QL IA: >250 U/ML — HIGH
SARS-COV-2 IGG+IGM SERPL QL IA: POSITIVE
SODIUM SERPL-SCNC: 138 MMOL/L — SIGNIFICANT CHANGE UP (ref 135–145)
WBC # BLD: 3.8 K/UL — SIGNIFICANT CHANGE UP (ref 3.8–10.5)
WBC # FLD AUTO: 3.8 K/UL — SIGNIFICANT CHANGE UP (ref 3.8–10.5)

## 2021-07-21 PROCEDURE — 99233 SBSQ HOSP IP/OBS HIGH 50: CPT

## 2021-07-21 PROCEDURE — 72149 MRI LUMBAR SPINE W/DYE: CPT | Mod: 26

## 2021-07-21 PROCEDURE — 70551 MRI BRAIN STEM W/O DYE: CPT | Mod: 26

## 2021-07-21 RX ADMIN — IMMUNE GLOBULIN (HUMAN) 62.5 GRAM(S): 10 INJECTION INTRAVENOUS; SUBCUTANEOUS at 16:08

## 2021-07-21 RX ADMIN — Medication 650 MILLIGRAM(S): at 15:18

## 2021-07-21 RX ADMIN — Medication 25 MILLIGRAM(S): at 15:18

## 2021-07-21 RX ADMIN — ENOXAPARIN SODIUM 40 MILLIGRAM(S): 100 INJECTION SUBCUTANEOUS at 15:18

## 2021-07-21 NOTE — PROGRESS NOTE ADULT - NUTRITIONAL ASSESSMENT
21 year RH female with no reported past medical history who is presenting with difficulty ambulating and LE weakness. On PE proximal LE weakness 4at IPs, 4+ at knee extensors similar to slightly worse from yesterday arefelexia Decreased distal sensory in LEs to vibration     Impression: areflexia, LE weakness, gait instability concerning for GBS likely acute inflammatory demyelinating polyneuropathy    Recs:  LP grossly normal, often the case early in the course  Consider MRI LS spine w/con to assess for enhancement  recommend IVIG 2g/kg over 4 days  preceded by pre-medication with Tylenol 325mg and Benadryl 25mg po 30 minutes prior to administration  please obtain NIF q6  PT/OT  fall precautions . 21 year RH female with no reported past medical history who is presenting with difficulty ambulating and LE weakness. On PE proximal LE weakness 4at IPs, 4+ at knee extensors similar to slightly worse from yesterday arefelexia Decreased distal sensory in LEs to vibration     Impression: areflexia, LE weakness, gait instability concerning for GBS likely acute inflammatory demyelinating polyneuropathy    Recs:  LP grossly normal, often the case early in the course  MR BRain, LS spine non-revealing  recommend IVIG 2g/kg over 4 days  preceded by pre-medication with Tylenol 325mg and Benadryl 25mg po 30 minutes prior to administration  please obtain NIF q6  PT/OT  fall precautions .  Can follow up with Neurology, Dr. Sampson King at [phone] for an outpt EMG/NCS

## 2021-07-21 NOTE — PROGRESS NOTE ADULT - SUBJECTIVE AND OBJECTIVE BOX
PROGRESS NOTE:     Michael Lerner  Hospitalist  Pager- [phone]    Patient is a 21y old  Female who presents with a chief complaint of Light headedness, fatigue, weakness, ataxia (21 Jul 2021 11:16)      SUBJECTIVE / OVERNIGHT EVENTS: pt seen and examined by me   Parents at bedside  C/o Ongoing weakness in LE, no significant improvement   Pt very upset about her condition. Scared with her situation not knowing the outcome     ADDITIONAL REVIEW OF SYSTEMS:    MEDICATIONS  (STANDING):  acetaminophen   Tablet .. 650 milliGRAM(s) Oral every 24 hours  diphenhydrAMINE 25 milliGRAM(s) Oral every 24 hours  enoxaparin Injectable 40 milliGRAM(s) SubCutaneous daily  immune   globulin 10% (GAMMAGARD) IVPB 25 Gram(s) IV Intermittent every 24 hours    MEDICATIONS  (PRN):      CAPILLARY BLOOD GLUCOSE        I&O's Summary      PHYSICAL EXAM:  Vital Signs Last 24 Hrs  T(C): 36.8 (21 Jul 2021 06:12), Max: 37.4 (20 Jul 2021 14:35)  T(F): 98.2 (21 Jul 2021 06:12), Max: 99.3 (20 Jul 2021 14:35)  HR: 60 (21 Jul 2021 06:12) (60 - 71)  BP: 110/80 (21 Jul 2021 06:12) (93/60 - 110/80)  BP(mean): --  RR: 16 (21 Jul 2021 06:12) (16 - 18)  SpO2: 98% (21 Jul 2021 06:12) (98% - 100%)    CONSTITUTIONAL: NAD, well-developed  RESPIRATORY: Normal respiratory effort; lungs are clear to auscultation bilaterally  CARDIOVASCULAR: Regular rate and rhythm, normal S1 and S2, no murmur/rub/gallop; No lower extremity edema; Peripheral pulses are 2+ bilaterally  ABDOMEN: Nontender to palpation, normoactive bowel sounds, no rebound/guarding; No hepatosplenomegaly  MUSCLOSKELETAL: no clubbing or cyanosis of digits; no joint swelling or tenderness to palpation  PSYCH: A+O to person, place, and time; affect appropriate  NEURO: AAOx 3, speech fluent ,sensation intact, BLE- strength 4/5      SKIN:    LABS:                        12.2   3.80  )-----------( 216      ( 21 Jul 2021 09:45 )             40.2     07-21    138  |  100  |  12  ----------------------------<  106<H>  4.2   |  23  |  0.60    Ca    9.7      21 Jul 2021 09:45  Phos  4.7     07-21  Mg     2.30     07-21    TPro  x   /  Alb  4094  /  TBili  x   /  DBili  x   /  AST  x   /  ALT  x   /  AlkPhos  x   07-20              Culture - CSF with Gram Stain (collected 20 Jul 2021 10:40)  Source: .CSF CSF  Gram Stain (20 Jul 2021 12:00):    No polymorphonuclear leukocytes seen    No organisms seen    by cytocentrifuge  Preliminary Report (21 Jul 2021 06:48):    No growth        RADIOLOGY & ADDITIONAL TESTS:  Results Reviewed:   Imaging Personally Reviewed:  Electrocardiogram Personally Reviewed:    COORDINATION OF CARE:  Care Discussed with Consultants/Other Providers [Y/N]:  Prior or Outpatient Records Reviewed [Y/N]:

## 2021-07-21 NOTE — PROGRESS NOTE ADULT - ASSESSMENT
Kylee Aldridge is a 21 y.o. R-Handed F with no reported past medical history who is presenting with difficulty ambulating and LE weakness. Neurological exam remains areflexic and slight worse strength in b/l LE upon knee flexion and extension. LP on 7/20 without signs of albuminocytologic dissociation. Unable to obtain accurate NIF due to poor effort. CTH on 7/20 was negative for any lesions.     Impression: areflexia, LE weakness, subtle gait instability concerning for an acute inflammatory demyelinating polyneuropathy, possible variant of Corado-Quan as symptoms are predominantly areflexia and ataxia but without an overt ophthalmoplegia. Can also consider a myopathy as she is complaining of predominantly proximal muscle problems but areflexia is unusual. Unlikely to be a myasthenia gravis presentation as patient without fatigable symptoms and areflexia would be unusual or MS, transverse myelitis as patient without upper motor neuron signs.     Recs:  [x] head CT   [x] LP to check for albuminocytologic dissociation  [] f/u Ganglioside antibodies, Gq1b  [] c/w IVIG 2g/kg over 4 days; completed 1/4; can pre-medication with Tylenol 325mg and Benadryl 25mg po 30 minutes prior to administration  [] c/w NIF q6, encouraged pt for participation  [] PT/OT  [] fall precautions    Discussed with neurology attending, Dr. King.

## 2021-07-21 NOTE — PROGRESS NOTE ADULT - SUBJECTIVE AND OBJECTIVE BOX
SUBJECTIVE/INTERVAL HISTORY:  - received 25 g IVIG x1 on 7/20 without any reaction  - respiratory therapist attempted NIF at ~3:30 AM and pt had poor effort due to sleepiness  - pt feels generalized weakness that is worse compared to 7/20      PAST MEDICAL & SURGICAL HISTORY:  No pertinent past medical history    No significant past surgical history      MEDICATIONS (HOME):  Home Medications:  Retin-A 0.1% topical cream: Apply topically to affected area once a day (at bedtime) (20 Jul 2021 07:57)    MEDICATIONS  (STANDING):  acetaminophen   Tablet .. 650 milliGRAM(s) Oral every 24 hours  diphenhydrAMINE 25 milliGRAM(s) Oral every 24 hours  enoxaparin Injectable 40 milliGRAM(s) SubCutaneous daily  immune   globulin 10% (GAMMAGARD) IVPB 25 Gram(s) IV Intermittent every 24 hours    MEDICATIONS  (PRN):    ALLERGIES/INTOLERANCES:  Allergies  No Known Drug Allergies  Pollen (Unknown)    Intolerances    VITALS & EXAMINATION:  Vital Signs Last 24 Hrs  T(C): 36.8 (21 Jul 2021 06:12), Max: 37.4 (20 Jul 2021 14:35)  T(F): 98.2 (21 Jul 2021 06:12), Max: 99.3 (20 Jul 2021 14:35)  HR: 60 (21 Jul 2021 06:12) (60 - 76)  BP: 110/80 (21 Jul 2021 06:12) (93/60 - 110/80)  BP(mean): --  RR: 16 (21 Jul 2021 06:12) (16 - 18)  SpO2: 98% (21 Jul 2021 06:12) (98% - 100%)    General:  Constitutional: normal weight Female, appears stated age, in no apparent distress including pain but feels generalized weakness  Head: Normocephalic & atraumatic.    Neurological (>12):  MS: Awake, alert, oriented to person, place, situation, time. Normal affect. Follows all commands.    Language: no dysarthria     Motor: Normal muscle bulk & tone. No noticeable tremor or seizure. Neck flexion strength normal              Deltoid	Biceps	Triceps	Wrist	Finger ABd	   R	5	5	5	5	5		4 	  L	5	5	5	5	5		4    	H-Flex	H-Ext	K-Flex	K-Ext	D-Flex	P-Flex  R	5	5	4	4	5	5		 	   L	5	5	4	4	5	5			     Sensation: Intact to light touch, no difference in sensation between R and L & UE and LE    Cortical: Extinction on DSS (neglect): none    Reflexes:              Biceps(C5)       BR(C6)     Triceps(C7)               Patellar(L4)    Achilles(S1)      R	0	          0	             0		        0		    0		   L	0	          0	             0		        0		    0	    Gait: deferred but noted to be able to walk to bathroom with one person assistance    LABORATORY:  CBC                       12.2   3.80  )-----------( 216      ( 21 Jul 2021 09:45 )             40.2     Chem 07-21    138  |  100  |  12  ----------------------------<  106<H>  4.2   |  23  |  0.60    Ca    9.7      21 Jul 2021 09:45  Phos  4.7     07-21  Mg     2.30     07-21    TPro  8.2  /  Alb  5.3<H>  /  TBili  0.5  /  DBili  x   /  AST  16  /  ALT  9   /  AlkPhos  67  07-19    LFTs LIVER FUNCTIONS - ( 19 Jul 2021 23:13 )  Alb: 5.3 g/dL / Pro: 8.2 g/dL / ALK PHOS: 67 U/L / ALT: 9 U/L / AST: 16 U/L / GGT: x           Culture - CSF with Gram Stain . (07.20.21 @ 10:40)   Gram Stain:   No polymorphonuclear leukocytes seen   No organisms seen     CSF PCR Result: [facility]    Cerebrospinal Fluid Cell Count-1 (07.20.21 @ 05:39)   Tube Type: Tube 1   Other Cells - Spinal Fluid: 0 %   Total Cells Counted, Spinal Fluid: 50 Cells   RBC Count - Spinal Fluid: 1: Red Cell count correlates with the number and proportion of cells on   cytospin preparation. cells/uL   CSF Color: Colorless   Eosinophil Count - Spinal Fluid: 2 %   CSF Appearance: Clear   CSF Lymphocytes: 74 %   CSF Monocytes/Macrophages: 18 %   CSF Segmented Neutrophils: 6 %   Appearance Spun: Colorless   Atypical Lymphocytes - CSF: 0 %   Total Nucleated Cell Count, CSF: 3 cells/uL   CSF LDH 20  CSF Protein 23      Radiology:  < from: CT Head No Cont (07.20.21 @ 03:42) >    FINDINGS:    No acute intracranial hemorrhage, mass effect, vasogenic edema, or evidence of acute territorial infarct. Evaluation of the posterior fossa is degraded by streak artifact.    The visualized paranasal sinuses are clear. The mastoid air cells and middle ear cavities are clear.    The globes are unremarkable.    The soft tissues of the scalp are unremarkable. The calvarium is intact.    IMPRESSION:    No CT evidence of acute intracranial hemorrhage, brain edema, or mass effect.    --- End of Report ---      < end of copied text >     SUBJECTIVE/INTERVAL HISTORY:  - received 25 g IVIG x1 on 7/20 without any reaction  - respiratory therapist attempted NIF at ~3:30 AM and pt had poor effort due to sleepiness  - pt feels generalized weakness that is worse compared to 7/20  - VSS otherwise, satting well on RA      PAST MEDICAL & SURGICAL HISTORY:  No pertinent past medical history    No significant past surgical history      MEDICATIONS (HOME):  Home Medications:  Retin-A 0.1% topical cream: Apply topically to affected area once a day (at bedtime) (20 Jul 2021 07:57)    MEDICATIONS  (STANDING):  acetaminophen   Tablet .. 650 milliGRAM(s) Oral every 24 hours  diphenhydrAMINE 25 milliGRAM(s) Oral every 24 hours  enoxaparin Injectable 40 milliGRAM(s) SubCutaneous daily  immune   globulin 10% (GAMMAGARD) IVPB 25 Gram(s) IV Intermittent every 24 hours    MEDICATIONS  (PRN):    ALLERGIES/INTOLERANCES:  Allergies  No Known Drug Allergies  Pollen (Unknown)    Intolerances    VITALS & EXAMINATION:  Vital Signs Last 24 Hrs  T(C): 36.8 (21 Jul 2021 06:12), Max: 37.4 (20 Jul 2021 14:35)  T(F): 98.2 (21 Jul 2021 06:12), Max: 99.3 (20 Jul 2021 14:35)  HR: 60 (21 Jul 2021 06:12) (60 - 76)  BP: 110/80 (21 Jul 2021 06:12) (93/60 - 110/80)  BP(mean): --  RR: 16 (21 Jul 2021 06:12) (16 - 18)  SpO2: 98% (21 Jul 2021 06:12) (98% - 100%)    General:  Constitutional: normal weight Female, appears stated age, in no apparent distress including pain but feels generalized weakness  Head: Normocephalic & atraumatic.    Neurological (>12):  MS: Awake, alert, oriented to person, place, situation, time. Normal affect. Follows all commands.    Language: no dysarthria     Motor: Normal muscle bulk & tone. No noticeable tremor or seizure. Neck flexion strength normal              Deltoid	Biceps	Triceps	Wrist	Finger ABd	   R	5	5	5	5	5		4 	  L	5	5	5	5	5		4    	H-Flex	H-Ext	K-Flex	K-Ext	D-Flex	P-Flex  R	5	5	4	4	5	5		 	   L	5	5	4	4	5	5			     Sensation: Intact to light touch, no difference in sensation between R and L & UE and LE    Cortical: Extinction on DSS (neglect): none    Reflexes:              Biceps(C5)       BR(C6)     Triceps(C7)               Patellar(L4)    Achilles(S1)      R	0	          0	             0		        0		    0		   L	0	          0	             0		        0		    0	    Gait: deferred but noted to be able to walk to bathroom with one person assistance    LABORATORY:  CBC                       12.2   3.80  )-----------( 216      ( 21 Jul 2021 09:45 )             40.2     Chem 07-21    138  |  100  |  12  ----------------------------<  106<H>  4.2   |  23  |  0.60    Ca    9.7      21 Jul 2021 09:45  Phos  4.7     07-21  Mg     2.30     07-21    TPro  8.2  /  Alb  5.3<H>  /  TBili  0.5  /  DBili  x   /  AST  16  /  ALT  9   /  AlkPhos  67  07-19    LFTs LIVER FUNCTIONS - ( 19 Jul 2021 23:13 )  Alb: 5.3 g/dL / Pro: 8.2 g/dL / ALK PHOS: 67 U/L / ALT: 9 U/L / AST: 16 U/L / GGT: x           Culture - CSF with Gram Stain . (07.20.21 @ 10:40)   Gram Stain:   No polymorphonuclear leukocytes seen   No organisms seen     CSF PCR Result: [facility]    Cerebrospinal Fluid Cell Count-1 (07.20.21 @ 05:39)   Tube Type: Tube 1   Other Cells - Spinal Fluid: 0 %   Total Cells Counted, Spinal Fluid: 50 Cells   RBC Count - Spinal Fluid: 1: Red Cell count correlates with the number and proportion of cells on   cytospin preparation. cells/uL   CSF Color: Colorless   Eosinophil Count - Spinal Fluid: 2 %   CSF Appearance: Clear   CSF Lymphocytes: 74 %   CSF Monocytes/Macrophages: 18 %   CSF Segmented Neutrophils: 6 %   Appearance Spun: Colorless   Atypical Lymphocytes - CSF: 0 %   Total Nucleated Cell Count, CSF: 3 cells/uL   CSF LDH 20  CSF Protein 23      Radiology:  < from: CT Head No Cont (07.20.21 @ 03:42) >    FINDINGS:    No acute intracranial hemorrhage, mass effect, vasogenic edema, or evidence of acute territorial infarct. Evaluation of the posterior fossa is degraded by streak artifact.    The visualized paranasal sinuses are clear. The mastoid air cells and middle ear cavities are clear.    The globes are unremarkable.    The soft tissues of the scalp are unremarkable. The calvarium is intact.    IMPRESSION:    No CT evidence of acute intracranial hemorrhage, brain edema, or mass effect.    --- End of Report ---      < end of copied text >

## 2021-07-21 NOTE — PROGRESS NOTE ADULT - PROBLEM SELECTOR PLAN 1
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  FU Lyme studies/ Oligoclonal bands   FU  ganglioside antibodies and Gq1b (in process)  NIF q6 ordered for close monitoring as it is extremely important for pt with GBS to have monitor for sudden changes in respiratory status   PT eval

## 2021-07-22 LAB
ANION GAP SERPL CALC-SCNC: 13 MMOL/L — SIGNIFICANT CHANGE UP (ref 7–14)
BASOPHILS # BLD AUTO: 0.01 K/UL — SIGNIFICANT CHANGE UP (ref 0–0.2)
BASOPHILS NFR BLD AUTO: 0.3 % — SIGNIFICANT CHANGE UP (ref 0–2)
BUN SERPL-MCNC: 15 MG/DL — SIGNIFICANT CHANGE UP (ref 7–23)
CALCIUM SERPL-MCNC: 9.1 MG/DL — SIGNIFICANT CHANGE UP (ref 8.4–10.5)
CHLORIDE SERPL-SCNC: 102 MMOL/L — SIGNIFICANT CHANGE UP (ref 98–107)
CO2 SERPL-SCNC: 23 MMOL/L — SIGNIFICANT CHANGE UP (ref 22–31)
CREAT SERPL-MCNC: 0.63 MG/DL — SIGNIFICANT CHANGE UP (ref 0.5–1.3)
EOSINOPHIL # BLD AUTO: 0.02 K/UL — SIGNIFICANT CHANGE UP (ref 0–0.5)
EOSINOPHIL NFR BLD AUTO: 0.7 % — SIGNIFICANT CHANGE UP (ref 0–6)
GLUCOSE SERPL-MCNC: 81 MG/DL — SIGNIFICANT CHANGE UP (ref 70–99)
HCT VFR BLD CALC: 34.2 % — LOW (ref 34.5–45)
HGB BLD-MCNC: 10.2 G/DL — LOW (ref 11.5–15.5)
IANC: 1.37 K/UL — LOW (ref 1.5–8.5)
IMM GRANULOCYTES NFR BLD AUTO: 0 % — SIGNIFICANT CHANGE UP (ref 0–1.5)
LYMPHOCYTES # BLD AUTO: 1.12 K/UL — SIGNIFICANT CHANGE UP (ref 1–3.3)
LYMPHOCYTES # BLD AUTO: 38.5 % — SIGNIFICANT CHANGE UP (ref 13–44)
MAGNESIUM SERPL-MCNC: 2.2 MG/DL — SIGNIFICANT CHANGE UP (ref 1.6–2.6)
MCHC RBC-ENTMCNC: 27.3 PG — SIGNIFICANT CHANGE UP (ref 27–34)
MCHC RBC-ENTMCNC: 29.8 GM/DL — LOW (ref 32–36)
MCV RBC AUTO: 91.7 FL — SIGNIFICANT CHANGE UP (ref 80–100)
MONOCYTES # BLD AUTO: 0.39 K/UL — SIGNIFICANT CHANGE UP (ref 0–0.9)
MONOCYTES NFR BLD AUTO: 13.4 % — SIGNIFICANT CHANGE UP (ref 2–14)
NEUTROPHILS # BLD AUTO: 1.37 K/UL — LOW (ref 1.8–7.4)
NEUTROPHILS NFR BLD AUTO: 47.1 % — SIGNIFICANT CHANGE UP (ref 43–77)
NRBC # BLD: 0 /100 WBCS — SIGNIFICANT CHANGE UP
NRBC # FLD: 0 K/UL — SIGNIFICANT CHANGE UP
PHOSPHATE SERPL-MCNC: 4.6 MG/DL — HIGH (ref 2.5–4.5)
PLATELET # BLD AUTO: 204 K/UL — SIGNIFICANT CHANGE UP (ref 150–400)
POTASSIUM SERPL-MCNC: 3.9 MMOL/L — SIGNIFICANT CHANGE UP (ref 3.5–5.3)
POTASSIUM SERPL-SCNC: 3.9 MMOL/L — SIGNIFICANT CHANGE UP (ref 3.5–5.3)
RBC # BLD: 3.73 M/UL — LOW (ref 3.8–5.2)
RBC # FLD: 11 % — SIGNIFICANT CHANGE UP (ref 10.3–14.5)
SODIUM SERPL-SCNC: 138 MMOL/L — SIGNIFICANT CHANGE UP (ref 135–145)
WBC # BLD: 2.91 K/UL — LOW (ref 3.8–10.5)
WBC # FLD AUTO: 2.91 K/UL — LOW (ref 3.8–10.5)

## 2021-07-22 PROCEDURE — 99222 1ST HOSP IP/OBS MODERATE 55: CPT

## 2021-07-22 PROCEDURE — 99233 SBSQ HOSP IP/OBS HIGH 50: CPT

## 2021-07-22 RX ORDER — ACETAMINOPHEN 500 MG
650 TABLET ORAL EVERY 6 HOURS
Refills: 0 | Status: DISCONTINUED | OUTPATIENT
Start: 2021-07-22 | End: 2021-07-24

## 2021-07-22 RX ADMIN — Medication 25 MILLIGRAM(S): at 12:42

## 2021-07-22 RX ADMIN — ENOXAPARIN SODIUM 40 MILLIGRAM(S): 100 INJECTION SUBCUTANEOUS at 12:42

## 2021-07-22 RX ADMIN — Medication 650 MILLIGRAM(S): at 12:42

## 2021-07-22 RX ADMIN — IMMUNE GLOBULIN (HUMAN) 62.5 GRAM(S): 10 INJECTION INTRAVENOUS; SUBCUTANEOUS at 13:30

## 2021-07-22 NOTE — PROGRESS NOTE ADULT - SUBJECTIVE AND OBJECTIVE BOX
SUBJECTIVE/INTERVAL HISTORY:  - ovn NIF maintained from -38 to -45 with good effort  - has headache with light headedness upon getting up from the bed and walking  - later afternoon planned to receive third IVIG tx  - weakness overall feels about the same and not getting worse compared to previous three days  - VSS otherwise, satting well on RA    PAST MEDICAL & SURGICAL HISTORY:  No pertinent past medical history    No significant past surgical history      MEDICATIONS (HOME):  Home Medications:  Retin-A 0.1% topical cream: Apply topically to affected area once a day (at bedtime) (20 Jul 2021 07:57)    MEDICATIONS  (STANDING):  acetaminophen   Tablet .. 650 milliGRAM(s) Oral every 24 hours  diphenhydrAMINE 25 milliGRAM(s) Oral every 24 hours  enoxaparin Injectable 40 milliGRAM(s) SubCutaneous daily  immune   globulin 10% (GAMMAGARD) IVPB 25 Gram(s) IV Intermittent every 24 hours    MEDICATIONS  (PRN):    ALLERGIES/INTOLERANCES:  Allergies  No Known Drug Allergies  Pollen (Unknown)    Intolerances    VITALS & EXAMINATION:  Vital Signs Last 24 Hrs  T(C): 36.6 (22 Jul 2021 04:19), Max: 36.9 (21 Jul 2021 16:25)  T(F): 97.8 (22 Jul 2021 04:19), Max: 98.5 (21 Jul 2021 16:25)  HR: 65 (22 Jul 2021 04:19) (59 - 73)  BP: 104/69 (22 Jul 2021 04:19) (94/57 - 108/63)  BP(mean): --  RR: 15 (22 Jul 2021 04:19) (14 - 16)  SpO2: 99% (22 Jul 2021 04:19) (99% - 100%)    General:  Constitutional: normal weight Female, appears stated age, in no apparent distress including pain but feels generalized weakness  Head: Normocephalic & atraumatic    Neurological (>12):  MS: Awake, alert, oriented to person, place, situation, time. Normal affect. Follows all commands.    Language: no dysarthria     Motor: Normal muscle bulk & tone. No noticeable tremor or seizure. Neck flexion strength normal              Deltoid	Biceps	Triceps	Wrist	Finger ABd	   R	5	5	5	5	5		4 	  L	5	5	5	5	5		4    	H-Flex	H-Ext	K-Flex	K-Ext	D-Flex	P-Flex  R	5	5	5	5	5	5		 	   L	5	5	5	5	5	5			     Sensation: Intact to light touch throughout extremities      Reflexes:              Biceps(C5)       BR(C6)     Triceps(C7)               Patellar(L4)    Achilles(S1)      R	0	          0	             0		        0		    0		   L	0	          1	             0		        0		    0	    Coordination: no ataxia for FTN test    Gait: deferred but noted to be able to walk to hallway with one person assistance    LABORATORY:  CBC                       10.2   2.91  )-----------( 204      ( 22 Jul 2021 07:08 )             34.2     Chem 07-22    138  |  102  |  15  ----------------------------<  81  3.9   |  23  |  0.63    Ca    9.1      22 Jul 2021 07:08  Phos  4.6     07-22  Mg     2.20     07-22    TPro  x   /  Alb  4094  /  TBili  x   /  DBili  x   /  AST  x   /  ALT  x   /  AlkPhos  x   07-20    LFTs LIVER FUNCTIONS - ( 20 Jul 2021 17:45 )  Alb: 4094 mg/dL / Pro: x     / ALK PHOS: x     / ALT: x     / AST: x     / GGT: x          SUBJECTIVE/INTERVAL HISTORY:  - ovn NIF maintained from -38 to -45 with good effort  - has headache with light headedness upon getting up from the bed and walking  - later afternoon planned to receive third IVIG tx  - weakness overall feels about the same and not getting worse compared to previous three days  - VSS otherwise, satting well on RA    PAST MEDICAL & SURGICAL HISTORY:  No pertinent past medical history    No significant past surgical history      MEDICATIONS (HOME):  Home Medications:  Retin-A 0.1% topical cream: Apply topically to affected area once a day (at bedtime) (20 Jul 2021 07:57)    MEDICATIONS  (STANDING):  acetaminophen   Tablet .. 650 milliGRAM(s) Oral every 24 hours  diphenhydrAMINE 25 milliGRAM(s) Oral every 24 hours  enoxaparin Injectable 40 milliGRAM(s) SubCutaneous daily  immune   globulin 10% (GAMMAGARD) IVPB 25 Gram(s) IV Intermittent every 24 hours    MEDICATIONS  (PRN):    ALLERGIES/INTOLERANCES:  Allergies  No Known Drug Allergies  Pollen (Unknown)    Intolerances    VITALS & EXAMINATION:  Vital Signs Last 24 Hrs  T(C): 36.6 (22 Jul 2021 04:19), Max: 36.9 (21 Jul 2021 16:25)  T(F): 97.8 (22 Jul 2021 04:19), Max: 98.5 (21 Jul 2021 16:25)  HR: 65 (22 Jul 2021 04:19) (59 - 73)  BP: 104/69 (22 Jul 2021 04:19) (94/57 - 108/63)  BP(mean): --  RR: 15 (22 Jul 2021 04:19) (14 - 16)  SpO2: 99% (22 Jul 2021 04:19) (99% - 100%)    General:  Constitutional: normal weight Female, appears stated age, in no apparent distress including pain but feels generalized weakness  Head: Normocephalic & atraumatic    Neurological (>12):  MS: Awake, alert, oriented to person, place, situation, time. Normal affect. Follows all commands.    Language: no dysarthria     Motor: Normal muscle bulk & tone. No noticeable tremor or seizure. Neck flexion strength normal              Deltoid	Biceps	Triceps	Wrist	Finger ABd	   R	5	5	5	5	5		4 	  L	5	5	5	5	5		4    	H-Flex	H-Ext	K-Flex	K-Ext	D-Flex	P-Flex  R	5	5	5	5	5	5		 	   L	5	5	5	5	5	5			     Sensation: Intact to light touch throughout extremities      Reflexes:              Biceps(C5)       BR(C6)     Triceps(C7)               Patellar(L4)    Achilles(S1)      R	0	          0	             0		        0		    0		   L	0	          1	             0		        0		    0	    Coordination: no ataxia for FTN test    Gait: deferred but noted to be able to walk to hallway with one person assistance    LABORATORY:  CBC                       10.2   2.91  )-----------( 204      ( 22 Jul 2021 07:08 )             34.2     Chem 07-22    138  |  102  |  15  ----------------------------<  81  3.9   |  23  |  0.63    Ca    9.1      22 Jul 2021 07:08  Phos  4.6     07-22  Mg     2.20     07-22    TPro  x   /  Alb  4094  /  TBili  x   /  DBili  x   /  AST  x   /  ALT  x   /  AlkPhos  x   07-20    LFTs LIVER FUNCTIONS - ( 20 Jul 2021 17:45 )  Alb: 4094 mg/dL / Pro: x     / ALK PHOS: x     / ALT: x     / AST: x     / GGT: x           < from: MR Head No Cont (07.21.21 @ 02:13) >  FINDINGS: The brain parenchyma is normal in signal and morphology. There is no evidence of acute ischemia on the diffusion-weighted images.    Ventricular size and configuration is unremarkable. No abnormal extra axial fluid collections are noted. Flow-voids are noted throughout the major intracranial vessels, on the T2 weighted images, consistent with their patency. The sella turcica and posterior fossa are unremarkable.    The paranasal sinuses and mastoid air cells are clear. Calvarial signal is within normal limits. The orbits appear unremarkable.    IMPRESSION: Unremarkable noncontrast brain MRI examination.      --- End of Report ---      < end of copied text >    < from: MR Lumbar Spine w/ IV Cont (07.21.21 @ 02:14) >  INTERPRETATION:  Clinical indication: Ataxia. Weakness.    MRI of the lumbar spine was performed using sagittal T1 and T2 and STIR sequence. Axial T1 and T2-weighted sequences were performed as well. The patient was injected with approximately 4.5 cc of Gadavist IV with 3 cc contrast discarded. Sagittal T1-weighted sequence performed fat suppression. Axial T1-weighted sequences were performed.    Loss of the normal lumbar lordosis is seen. This could be due to positioning or muscle spasm.    The vertebral body height alignment and marrow signal appear normal    The disc spaces appear preserved.    T12-L1: Normal    L1-2: Normal    L2-3: Normal    L3-4: Normal    L4-5: Normal    L5-S1: Normal    The conus ends at L2 and appears normal    Evaluation of the paraspinal soft tissues appear normal.    No abnormal areas enhancement seen.    IMPRESSION: Unremarkable contrast enhanced MR of the lumbar spine.    --- End of Report ---      < end of copied text >

## 2021-07-22 NOTE — PROGRESS NOTE ADULT - ASSESSMENT
21 y.o. R-Handed F with no reported past medical history who is presenting with difficulty ambulating and LE weakness. Neurological exam remains largely areflexic and improved strength in all extremities. LP on 7/20 without signs of albuminocytologic dissociation. Unable to obtain accurate NIF due to poor effort. CTH on 7/20 was negative for any lesions.     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 Tolerating IVIG tx with no acute worsening of neuromuscular or respiratory status    Recs:  [x] head CT   [x] LP to check for albuminocytologic dissociation  [] for headache, can try tylenol or motrin  [] f/u Ganglioside antibodies, Gq1b  [] c/w IVIG 2g/kg over 4 days; completed 2/4 treatments; can pre-medication with Tylenol 325mg and Benadryl 25mg po 30 minutes prior to administration  [] c/w NIF q6, encouraged pt for participation  [] PT/OT  [] fall precautions    Discussed with neurology attending, Dr. King.    21 y.o. R-Handed F with no reported past medical history who is presenting with difficulty ambulating and LE weakness. Neurological exam remains largely areflexic and improved strength in all extremities. LP on 7/20 without signs of albuminocytologic dissociation. Unable to obtain accurate NIF due to poor effort. CTH on 7/20 was negative for any lesions.     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 Tolerating IVIG tx with no acute worsening of neuromuscular or respiratory status    Recs:  [x] head CT   [x] LP to check for albuminocytologic dissociation  [] for headache, can try tylenol or motrin  [] obtain orthostatics for light headedness  [] f/u Ganglioside antibodies, Gq1b  [] c/w IVIG 2g/kg over 4 days; completed 2/4 treatments; can pre-medication with Tylenol 325mg and Benadryl 25mg po 30 minutes prior to administration  [] c/w NIF q6, encouraged pt for participation  [] c/w PT/OT  [] fall precautions  [] can f/u with Dr. King for further nerve conduction test within 2 weeks of discharge [phone]. [address] [place] [address]      Discussed with neurology attending, Dr. King.    21 y.o. R-Handed F with no reported past medical history who is presenting with difficulty ambulating and LE weakness. Neurological exam remains largely areflexic and improved strength in all extremities. LP on 7/20 without signs of albuminocytologic dissociation. Unable to obtain accurate NIF due to poor effort. CTH on 7/20 was negative for any lesions. MR brain wo contrast and lumbar spine w iv contrast negative for any acute findings but did note loss of normal lumbar lordosis due to positioning or muscle spasm.     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 Tolerating IVIG tx with no acute worsening of neuromuscular or respiratory status    Recs:  [x] CT brain, MR brain, MR lumbar spine  [x] LP to check for albuminocytologic dissociation  [] for headache, can try tylenol or motrin  [] obtain orthostatics for light headedness  [] f/u Ganglioside antibodies, Gq1b  [] c/w IVIG 2g/kg over 4 days; completed 2/4 treatments; can pre-medication with Tylenol 325mg and Benadryl 25mg po 30 minutes prior to administration  [] c/w NIF q6, encouraged pt for participation  [] c/w PT/OT  [] fall precautions  [] can f/u with Dr. King for further nerve conduction test within 2 weeks of discharge [phone]. [address] [place] [address]      Discussed with neurology attending, Dr. King.

## 2021-07-22 NOTE — CONSULT NOTE ADULT - SUBJECTIVE AND OBJECTIVE BOX
Patient is a 21y old  Female who presents with a chief complaint of Light headedness, fatigue, weakness, ataxia (22 Jul 2021 11:27)      HPI:  21F no PMHx presenting with lightheadedness, fatigue, and unsteady gait for the past 3 days (since Saturday). Patient states she was was in her usual state of health and felt fine on Friday. When she woke up Saturday, she noticed she felt very tired, lethargic and dizzy when she stood up. It was difficult for her to walk straight without holding onto something for support. Her symptoms became worse yesterday, specifically with reduced sensation and strength in her lower extremities. She endorses sensation of numbness in her finger tips as well, and feels as though her arms are weaker than normal, but not as weak as her legs. She states that when she walks her dad has to hold her up and she sways back and forth and she feel as though she does not have the strength in her legs to support her weight. She noticed her legs shaking slightly yesterday. She admits to decreased appetite since yesterday as well with decreased PO intake. She denies changes in her hearing or vision, headache, nausea, vomiting, bowel or bladder incontinence. She denies CP, SOB, abdominal pain, constipation, diarrhea, difficulty urinating, dysuria, fever, chills, nasal congestion. She does endorse dry cough worse in the morning time x 2 weeks as well as sensation of abdominal bloating yesterday that has since resolved. In the ED, no meds were given. She was evaluated by neurology and LP was performed. Vital signs were stable. (20 Jul 2021 08:18)      REVIEW OF SYSTEMS  Constitutional - No fever, No weight loss, No fatigue  HEENT - No eye pain, No visual disturbances, No difficulty hearing, No tinnitus, No vertigo, No neck pain  Respiratory - No cough, No wheezing, No shortness of breath  Cardiovascular - No chest pain, No palpitations  Gastrointestinal - No abdominal pain, No nausea, No vomiting, No diarrhea, No constipation  Genitourinary - No dysuria, No frequency, No hematuria, No incontinence  Neurological - No headaches, No memory loss, No loss of strength, No numbness, No tremors  Skin - No itching, No rashes, No lesions   Endocrine - No temperature intolerance  Musculoskeletal - No joint pain, No joint swelling, No muscle pain  Psychiatric - No depression, No anxiety    PAST MEDICAL & SURGICAL HISTORY  No pertinent past medical history    No significant past surgical history        SOCIAL HISTORY  Smoking - Denied  EtOH - Denied   Drugs - Denied    FUNCTIONAL HISTORY  Lives in private house with steps to enter, lives with parents  Independent    CURRENT FUNCTIONAL STATUS  bm: supervision  t: cg  gait: cg 50'     FAMILY HISTORY   No pertinent family history in first degree relatives        RECENT LABS/IMAGING  CBC Full  -  ( 22 Jul 2021 07:08 )  WBC Count : 2.91 K/uL  RBC Count : 3.73 M/uL  Hemoglobin : 10.2 g/dL  Hematocrit : 34.2 %  Platelet Count - Automated : 204 K/uL  Mean Cell Volume : 91.7 fL  Mean Cell Hemoglobin : 27.3 pg  Mean Cell Hemoglobin Concentration : 29.8 gm/dL  Auto Neutrophil # : 1.37 K/uL  Auto Lymphocyte # : 1.12 K/uL  Auto Monocyte # : 0.39 K/uL  Auto Eosinophil # : 0.02 K/uL  Auto Basophil # : 0.01 K/uL  Auto Neutrophil % : 47.1 %  Auto Lymphocyte % : 38.5 %  Auto Monocyte % : 13.4 %  Auto Eosinophil % : 0.7 %  Auto Basophil % : 0.3 %    07-22    138  |  102  |  15  ----------------------------<  81  3.9   |  23  |  0.63    Ca    9.1      22 Jul 2021 07:08  Phos  4.6     07-22  Mg     2.20     07-22    TPro  x   /  Alb  4094  /  TBili  x   /  DBili  x   /  AST  x   /  ALT  x   /  AlkPhos  x   07-20        VITALS  T(C): 36.8 (07-22-21 @ 13:45), Max: 37.1 (07-22-21 @ 13:25)  HR: 77 (07-22-21 @ 13:45) (59 - 80)  BP: 100/60 (07-22-21 @ 13:45) (94/57 - 108/63)  RR: 16 (07-22-21 @ 13:45) (14 - 16)  SpO2: 100% (07-22-21 @ 13:45) (99% - 100%)  Wt(kg): --    ALLERGIES  No Known Drug Allergies  Pollen (Unknown)      MEDICATIONS   acetaminophen   Tablet .. 650 milliGRAM(s) Oral every 6 hours PRN  acetaminophen   Tablet .. 650 milliGRAM(s) Oral every 24 hours  diphenhydrAMINE 25 milliGRAM(s) Oral every 24 hours  enoxaparin Injectable 40 milliGRAM(s) SubCutaneous daily  immune   globulin 10% (GAMMAGARD) IVPB 25 Gram(s) IV Intermittent every 24 hours      ----------------------------------------------------------------------------------------  PHYSICAL EXAM  Constitutional - NAD, Comfortable  HEENT - NCAT, EOMI  Neck - Supple, No limited ROM  Chest - CTA bilaterally, No wheeze, No rhonchi, No crackles  Cardiovascular - RRR, S1S2, No murmurs  Abdomen - BS+, Soft, NTND  Extremities - No C/C/E, No calf tenderness   Neurologic Exam -                    Cognitive - Awake, Alert, AAO to self, place, date, year, situation     Communication - Fluent, No dysarthria     Cranial Nerves - CN 2-12 intact     Motor - No focal deficits                    LEFT    UE - ShAB 5/5, EF 5/5, EE 5/5, WE 5/5,  5/5                    RIGHT UE - ShAB 5/5, EF 5/5, EE 5/5, WE 5/5,  5/5                    LEFT    LE - HF 5/5, KE 5/5, DF 5/5, PF 5/5                    RIGHT LE - HF 5/5, KE 5/5, DF 5/5, PF 5/5        Sensory - Intact to LT       Psychiatric - Mood stable, Affect WNL  ----------------------------------------------------------------------------------------  ASSESSMENT/PLAN    Pain -  DVT PPX -   Rehab -    Recommend ACUTE inpatient rehabilitation for the functional deficits consisting of 3 hours of therapy/day & 24 hour RN/daily PMR physician for comorbid medical management. Will continue to follow for ongoing rehab needs and recommendations.   Recommend [facility], patient DOES NOT meet acute inpatient rehabilitation criteria   Recommend DC HOME with outpatient   Recommend DC HOME with homecare   Follow up with [facility] - Call [phone] for an appointment Patient is a 21y old  Female who presents with a chief complaint of Light headedness, fatigue, weakness, ataxia (22 Jul 2021 11:27)      HPI:  21F no PMHx presenting with lightheadedness, fatigue, and unsteady gait for the past 3 days (since Saturday). Patient states she was was in her usual state of health and felt fine on Friday. When she woke up Saturday, she noticed she felt very tired, lethargic and dizzy when she stood up. It was difficult for her to walk straight without holding onto something for support. Her symptoms became worse yesterday, specifically with reduced sensation and strength in her lower extremities. She endorses sensation of numbness in her finger tips as well, and feels as though her arms are weaker than normal, but not as weak as her legs. She states that when she walks her dad has to hold her up and she sways back and forth and she feel as though she does not have the strength in her legs to support her weight. She noticed her legs shaking slightly yesterday. She admits to decreased appetite since yesterday as well with decreased PO intake. She denies changes in her hearing or vision, headache, nausea, vomiting, bowel or bladder incontinence. She denies CP, SOB, abdominal pain, constipation, diarrhea, difficulty urinating, dysuria, fever, chills, nasal congestion. She does endorse dry cough worse in the morning time x 2 weeks as well as sensation of abdominal bloating yesterday that has since resolved. In the ED, no meds were given. She was evaluated by neurology and LP was performed. Vital signs were stable. (20 Jul 2021 08:18)    Patient reports feeling sleepy today, states she feels weaker since starting IVIG.    ambulated 50' cg with PT yesterday.    REVIEW OF SYSTEMS  Constitutional - No fever, No weight loss, No fatigue  HEENT - No eye pain, No visual disturbances, No difficulty hearing, No tinnitus, No vertigo, No neck pain  Respiratory - No cough, No wheezing, No shortness of breath  Cardiovascular - No chest pain, No palpitations  Gastrointestinal - No abdominal pain, No nausea, No vomiting, No diarrhea, No constipation  Genitourinary - No dysuria, No frequency, No hematuria, No incontinence  Neurological - No headaches, No memory loss, +Mild loss of strength, No numbness, No tremors  Skin - No itching, No rashes, No lesions   Endocrine - No temperature intolerance  Musculoskeletal - No joint pain, No joint swelling, No muscle pain  Psychiatric - No depression, No anxiety    PAST MEDICAL & SURGICAL HISTORY  No pertinent past medical history  No significant past surgical history        SOCIAL HISTORY  Smoking - Denied  EtOH - Denied   Drugs - Denied    FUNCTIONAL HISTORY  Lives in private house with steps to enter, lives with parents  Independent    CURRENT FUNCTIONAL STATUS  bm: supervision  t: cg  gait: cg 50'     FAMILY HISTORY   No pertinent family history in first degree relatives        RECENT LABS/IMAGING  < from: MR Head No Cont (07.21.21 @ 02:13) >    EXAM:  MR BRAIN        PROCEDURE DATE:  Jul 21 2021         INTERPRETATION:  .    CLINICAL INFORMATION: Ataxia. Weakness.    TECHNIQUE: Multiplanar multisequential MRI of the brain was acquired without the administration of IV gadolinium.    COMPARISON: Prior CT study of the head dated 7/20/2021.    FINDINGS: The brain parenchyma is normal in signal and morphology. There is no evidence of acute ischemia on the diffusion-weighted images.    Ventricular size and configuration is unremarkable. No abnormal extra axial fluid collections are noted. Flow-voids are noted throughout the major intracranial vessels, on the T2 weighted images, consistent with their patency. The sella turcica and posterior fossa are unremarkable.    The paranasal sinuses and mastoid air cells are clear. Calvarial signal is within normal limits. The orbits appear unremarkable.    IMPRESSION: Unremarkable noncontrast brain MRI examination.      --- End of Report ---        < from: MR Lumbar Spine w/ IV Cont (07.21.21 @ 02:14) >    EXAM:  MR SPINE LUMBAR IC        PROCEDURE DATE:  Jul 21 2021         INTERPRETATION:  Clinical indication: Ataxia. Weakness.    MRI of the lumbar spine was performed using sagittal T1 and T2 and STIR sequence. Axial T1 and T2-weighted sequences were performed as well. The patient was injected with approximately 4.5 cc of Gadavist IV with 3 cc contrast discarded. Sagittal T1-weighted sequence performed fat suppression. Axial T1-weighted sequences were performed.    Loss of the normal lumbar lordosis is seen. This could be due to positioning or muscle spasm.    The vertebral body height alignment and marrow signal appear normal    The disc spaces appear preserved.    T12-L1: Normal    L1-2: Normal    L2-3: Normal    L3-4: Normal    L4-5: Normal    L5-S1: Normal    The conus ends at L2 and appears normal    Evaluation of the paraspinal soft tissues appear normal.    No abnormal areas enhancement seen.    IMPRESSION: Unremarkable contrast enhanced MR of the lumbar spine.    --- End of Report ---        < end of copied text >        WISAM PHILLIPS MD; Attending Radiologist  This document has been electronically signed. Jul 21 2021  8:11AM    < end of copied text >    CBC Full  -  ( 22 Jul 2021 07:08 )  WBC Count : 2.91 K/uL  RBC Count : 3.73 M/uL  Hemoglobin : 10.2 g/dL  Hematocrit : 34.2 %  Platelet Count - Automated : 204 K/uL  Mean Cell Volume : 91.7 fL  Mean Cell Hemoglobin : 27.3 pg  Mean Cell Hemoglobin Concentration : 29.8 gm/dL  Auto Neutrophil # : 1.37 K/uL  Auto Lymphocyte # : 1.12 K/uL  Auto Monocyte # : 0.39 K/uL  Auto Eosinophil # : 0.02 K/uL  Auto Basophil # : 0.01 K/uL  Auto Neutrophil % : 47.1 %  Auto Lymphocyte % : 38.5 %  Auto Monocyte % : 13.4 %  Auto Eosinophil % : 0.7 %  Auto Basophil % : 0.3 %    07-22    138  |  102  |  15  ----------------------------<  81  3.9   |  23  |  0.63    Ca    9.1      22 Jul 2021 07:08  Phos  4.6     07-22  Mg     2.20     07-22    TPro  x   /  Alb  4094  /  TBili  x   /  DBili  x   /  AST  x   /  ALT  x   /  AlkPhos  x   07-20        VITALS  T(C): 36.8 (07-22-21 @ 13:45), Max: 37.1 (07-22-21 @ 13:25)  HR: 77 (07-22-21 @ 13:45) (59 - 80)  BP: 100/60 (07-22-21 @ 13:45) (94/57 - 108/63)  RR: 16 (07-22-21 @ 13:45) (14 - 16)  SpO2: 100% (07-22-21 @ 13:45) (99% - 100%)  Wt(kg): --    ALLERGIES  No Known Drug Allergies  Pollen (Unknown)      MEDICATIONS   acetaminophen   Tablet .. 650 milliGRAM(s) Oral every 6 hours PRN  acetaminophen   Tablet .. 650 milliGRAM(s) Oral every 24 hours  diphenhydrAMINE 25 milliGRAM(s) Oral every 24 hours  enoxaparin Injectable 40 milliGRAM(s) SubCutaneous daily  immune   globulin 10% (GAMMAGARD) IVPB 25 Gram(s) IV Intermittent every 24 hours      ----------------------------------------------------------------------------------------  PHYSICAL EXAM  Constitutional - NAD, Comfortable, lethargic  HEENT - NCAT   Neck - Supple, No limited ROM  Chest - no respiratory distress  Cardiovascular - RRR, S1S2   Abdomen -  Soft, NTND  Extremities - No C/C/E, No calf tenderness   Neurologic Exam -                    Cognitive - Awake, Alert, AAO to self, place, date, year, situation     Communication - Fluent, No dysarthria     Cranial Nerves - CN 2-12 intact     Motor -                      LEFT    UE - 4+/5                    RIGHT UE - 4+/5                    LEFT    LE - 5/5                    RIGHT LE -  5/5     Sensory - Intact to LT  Psychiatric - Mood stable, Affect WNL  ----------------------------------------------------------------------------------------  ASSESSMENT/PLAN 22 yo f admitted with weakness, found to have gbs. Receiving IVIG (day 3)  LP done, receiving IVIG  Pain -tylenol prn  DVT PPX - scd  Diet: regular  continue bedside PT and OT for bed mobility, transfers, ambulation with Ad, adls  Rehab - goal is for home discharge with outpatient PT vs home care when medically cleared. Recommend stair clearance prior to discharge.  Patient ambulated with cg assistance yesterday, if functional status worsens can be assessed for inpatient acute rehab.

## 2021-07-22 NOTE — PROGRESS NOTE ADULT - SUBJECTIVE AND OBJECTIVE BOX
PROGRESS NOTE:     Michael Lerner  Hospitalist  Pager- [phone]    Patient is a 21y old  Female who presents with a chief complaint of Light headedness, fatigue, weakness, ataxia (21 Jul 2021 11:16)      SUBJECTIVE / OVERNIGHT EVENTS: pt seen and examined by me          ADDITIONAL REVIEW OF SYSTEMS:    MEDICATIONS  (STANDING):  acetaminophen   Tablet .. 650 milliGRAM(s) Oral every 24 hours  diphenhydrAMINE 25 milliGRAM(s) Oral every 24 hours  enoxaparin Injectable 40 milliGRAM(s) SubCutaneous daily  immune   globulin 10% (GAMMAGARD) IVPB 25 Gram(s) IV Intermittent every 24 hours    MEDICATIONS  (PRN):      CAPILLARY BLOOD GLUCOSE    I&O's Summary      PHYSICAL EXAM:    Vital Signs Last 24 Hrs  T(C): 36.6 (22 Jul 2021 04:19), Max: 36.9 (21 Jul 2021 16:25)  T(F): 97.8 (22 Jul 2021 04:19), Max: 98.5 (21 Jul 2021 16:25)  HR: 65 (22 Jul 2021 04:19) (59 - 73)  BP: 104/69 (22 Jul 2021 04:19) (94/57 - 108/63)  BP(mean): --  RR: 15 (22 Jul 2021 04:19) (14 - 16)  SpO2: 99% (22 Jul 2021 04:19) (99% - 100%)    CONSTITUTIONAL: NAD, well-developed  RESPIRATORY: Normal respiratory effort; lungs are clear to auscultation bilaterally  CARDIOVASCULAR: Regular rate and rhythm, normal S1 and S2, no murmur/rub/gallop; No lower extremity edema; Peripheral pulses are 2+ bilaterally  ABDOMEN: Nontender to palpation, normoactive bowel sounds, no rebound/guarding; No hepatosplenomegaly  MUSCLOSKELETAL: no clubbing or cyanosis of digits; no joint swelling or tenderness to palpation  PSYCH: A+O to person, place, and time; affect appropriate  NEURO: AAOx 3, speech fluent ,sensation intact, BLE- strength 4/5  SKIN: no rash     LABS:                            10.2   2.91  )-----------( 204      ( 22 Jul 2021 07:08 )             34.2       07-22    138  |  102  |  15  ----------------------------<  81  3.9   |  23  |  0.63    Ca    9.1      22 Jul 2021 07:08  Phos  4.6     07-22  Mg     2.20     07-22    TPro  x   /  Alb  4094  /  TBili  x   /  DBili  x   /  AST  x   /  ALT  x   /  AlkPhos  x   07-20        Culture - CSF with Gram Stain (collected 20 Jul 2021 10:40)  Source: .CSF CSF  Gram Stain (20 Jul 2021 12:00):    No polymorphonuclear leukocytes seen    No organisms seen    by cytocentrifuge  Preliminary Report (21 Jul 2021 06:48):    No growth        RADIOLOGY & ADDITIONAL TESTS:  Results Reviewed:   Imaging Personally Reviewed:  Electrocardiogram Personally Reviewed:    COORDINATION OF CARE:  Care Discussed with Consultants/Other Providers [Y/N]:  Prior or Outpatient Records Reviewed [Y/N]:   PROGRESS NOTE:     Michael Lerner  Hospitalist  Pager- [phone]    Patient is a 21y old  Female who presents with a chief complaint of Light headedness, fatigue, weakness, ataxia (21 Jul 2021 11:16)      SUBJECTIVE / OVERNIGHT EVENTS: pt seen and examined by me. Parents at bedside  Headache, right sided, 6-7/10  Reports weakness             ADDITIONAL REVIEW OF SYSTEMS:    MEDICATIONS  (STANDING):  acetaminophen   Tablet .. 650 milliGRAM(s) Oral every 24 hours  diphenhydrAMINE 25 milliGRAM(s) Oral every 24 hours  enoxaparin Injectable 40 milliGRAM(s) SubCutaneous daily  immune   globulin 10% (GAMMAGARD) IVPB 25 Gram(s) IV Intermittent every 24 hours    MEDICATIONS  (PRN):      CAPILLARY BLOOD GLUCOSE    I&O's Summary      PHYSICAL EXAM:    Vital Signs Last 24 Hrs  T(C): 36.6 (22 Jul 2021 04:19), Max: 36.9 (21 Jul 2021 16:25)  T(F): 97.8 (22 Jul 2021 04:19), Max: 98.5 (21 Jul 2021 16:25)  HR: 65 (22 Jul 2021 04:19) (59 - 73)  BP: 104/69 (22 Jul 2021 04:19) (94/57 - 108/63)  BP(mean): --  RR: 15 (22 Jul 2021 04:19) (14 - 16)  SpO2: 99% (22 Jul 2021 04:19) (99% - 100%)    CONSTITUTIONAL: NAD, well-developed  RESPIRATORY: Normal respiratory effort; lungs are clear to auscultation bilaterally  CARDIOVASCULAR: Regular rate and rhythm, normal S1 and S2, no murmur/rub/gallop; No lower extremity edema; Peripheral pulses are 2+ bilaterally  ABDOMEN: Nontender to palpation, normoactive bowel sounds, no rebound/guarding; No hepatosplenomegaly  MUSCLOSKELETAL: no clubbing or cyanosis of digits; no joint swelling or tenderness to palpation  PSYCH: A+O to person, place, and time; affect appropriate  NEURO: AAOx 3, speech fluent ,sensation intact, BLE- strength 4/5  SKIN: no rash     LABS:                            10.2   2.91  )-----------( 204      ( 22 Jul 2021 07:08 )             34.2       07-22    138  |  102  |  15  ----------------------------<  81  3.9   |  23  |  0.63    Ca    9.1      22 Jul 2021 07:08  Phos  4.6     07-22  Mg     2.20     07-22    TPro  x   /  Alb  4094  /  TBili  x   /  DBili  x   /  AST  x   /  ALT  x   /  AlkPhos  x   07-20        Culture - CSF with Gram Stain (collected 20 Jul 2021 10:40)  Source: .CSF CSF  Gram Stain (20 Jul 2021 12:00):    No polymorphonuclear leukocytes seen    No organisms seen    by cytocentrifuge  Preliminary Report (21 Jul 2021 06:48):    No growth        RADIOLOGY & ADDITIONAL TESTS:  Results Reviewed:   Imaging Personally Reviewed:  Electrocardiogram Personally Reviewed:    COORDINATION OF CARE:  Care Discussed with Consultants/Other Providers [Y/N]:  Prior or Outpatient Records Reviewed [Y/N]: Neuro    PROGRESS NOTE:     Michael Lerner  Hospitalist  Pager- [phone]    Patient is a 21y old  Female who presents with a chief complaint of Light headedness, fatigue, weakness, ataxia (21 Jul 2021 11:16)      SUBJECTIVE / OVERNIGHT EVENTS: pt seen and examined by me. Parents at bedside  Headache, right sided, 6-7/10  Reports weakness which is slowly improving   Denies tingling numbness, no new deficits          ADDITIONAL REVIEW OF SYSTEMS:    MEDICATIONS  (STANDING):  acetaminophen   Tablet .. 650 milliGRAM(s) Oral every 24 hours  diphenhydrAMINE 25 milliGRAM(s) Oral every 24 hours  enoxaparin Injectable 40 milliGRAM(s) SubCutaneous daily  immune   globulin 10% (GAMMAGARD) IVPB 25 Gram(s) IV Intermittent every 24 hours    MEDICATIONS  (PRN):      CAPILLARY BLOOD GLUCOSE    I&O's Summary      PHYSICAL EXAM:    Vital Signs Last 24 Hrs  T(C): 36.6 (22 Jul 2021 04:19), Max: 36.9 (21 Jul 2021 16:25)  T(F): 97.8 (22 Jul 2021 04:19), Max: 98.5 (21 Jul 2021 16:25)  HR: 65 (22 Jul 2021 04:19) (59 - 73)  BP: 104/69 (22 Jul 2021 04:19) (94/57 - 108/63)  BP(mean): --  RR: 15 (22 Jul 2021 04:19) (14 - 16)  SpO2: 99% (22 Jul 2021 04:19) (99% - 100%)    CONSTITUTIONAL: NAD, well-developed  RESPIRATORY: Normal respiratory effort; lungs are clear to auscultation bilaterally  CARDIOVASCULAR: Regular rate and rhythm, normal S1 and S2, no murmur/rub/gallop; No lower extremity edema; Peripheral pulses are 2+ bilaterally  ABDOMEN: Nontender to palpation, normoactive bowel sounds, no rebound/guarding; No hepatosplenomegaly  MUSCLOSKELETAL: no clubbing or cyanosis of digits; no joint swelling or tenderness to palpation  PSYCH: A+O to person, place, and time; affect appropriate  NEURO: AAOx 3, speech fluent ,sensation intact, BLE- strength 4/5  SKIN: no rash     LABS:                            10.2   2.91  )-----------( 204      ( 22 Jul 2021 07:08 )             34.2       07-22    138  |  102  |  15  ----------------------------<  81  3.9   |  23  |  0.63    Ca    9.1      22 Jul 2021 07:08  Phos  4.6     07-22  Mg     2.20     07-22    TPro  x   /  Alb  4094  /  TBili  x   /  DBili  x   /  AST  x   /  ALT  x   /  AlkPhos  x   07-20        Culture - CSF with Gram Stain (collected 20 Jul 2021 10:40)  Source: .CSF CSF  Gram Stain (20 Jul 2021 12:00):    No polymorphonuclear leukocytes seen    No organisms seen    by cytocentrifuge  Preliminary Report (21 Jul 2021 06:48):    No growth        RADIOLOGY & ADDITIONAL TESTS:  Results Reviewed:   Imaging Personally Reviewed:  Electrocardiogram Personally Reviewed:    COORDINATION OF CARE:  Care Discussed with Consultants/Other Providers [Y/N]:  Prior or Outpatient Records Reviewed [Y/N]: Neuro  Neuro

## 2021-07-22 NOTE — PROGRESS NOTE ADULT - PROBLEM SELECTOR PLAN 1
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  FU Lyme studies/ Oligoclonal bands   FU  ganglioside antibodies and Gq1b (in process)  NIF q6 ordered for close monitoring as it is extremely important for pt with GBS to have monitor for sudden changes in respiratory status   PT eval 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  FU Lyme studies/ Oligoclonal bands   FU  ganglioside antibodies and Gq1b (in process)  NIF q6 ordered for close monitoring as it is extremely important for pt with GBS to have monitor for sudden changes in respiratory status   PT eval - outpt PT. 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  FU Lyme studies/ Oligoclonal bands   FU  ganglioside antibodies and Gq1b (in process)  NIF q6 ordered for close monitoring as it is extremely important for pt with GBS to have monitor for sudden changes in respiratory status   Labs with mild pancytopenia- likely dilutional- Monitor   PT eval - outpt PT. FU PMR

## 2021-07-23 DIAGNOSIS — D61.818 OTHER PANCYTOPENIA: ICD-10-CM

## 2021-07-23 DIAGNOSIS — R42 DIZZINESS AND GIDDINESS: ICD-10-CM

## 2021-07-23 LAB
ANION GAP SERPL CALC-SCNC: 11 MMOL/L — SIGNIFICANT CHANGE UP (ref 7–14)
BASOPHILS # BLD AUTO: 0.01 K/UL — SIGNIFICANT CHANGE UP (ref 0–0.2)
BASOPHILS NFR BLD AUTO: 0.3 % — SIGNIFICANT CHANGE UP (ref 0–2)
BUN SERPL-MCNC: 11 MG/DL — SIGNIFICANT CHANGE UP (ref 7–23)
CALCIUM SERPL-MCNC: 9.2 MG/DL — SIGNIFICANT CHANGE UP (ref 8.4–10.5)
CHLORIDE SERPL-SCNC: 103 MMOL/L — SIGNIFICANT CHANGE UP (ref 98–107)
CO2 SERPL-SCNC: 24 MMOL/L — SIGNIFICANT CHANGE UP (ref 22–31)
CREAT SERPL-MCNC: 0.58 MG/DL — SIGNIFICANT CHANGE UP (ref 0.5–1.3)
CULTURE RESULTS: NO GROWTH — SIGNIFICANT CHANGE UP
EOSINOPHIL # BLD AUTO: 0.02 K/UL — SIGNIFICANT CHANGE UP (ref 0–0.5)
EOSINOPHIL NFR BLD AUTO: 0.6 % — SIGNIFICANT CHANGE UP (ref 0–6)
GD1A GANGL IGG+IGM SER IA-ACNC: 12 IV — SIGNIFICANT CHANGE UP (ref 0–50)
GD1B GANGL IGG+IGM SER IA-ACNC: 17 IV — SIGNIFICANT CHANGE UP (ref 0–50)
GLUCOSE SERPL-MCNC: 85 MG/DL — SIGNIFICANT CHANGE UP (ref 70–99)
GM1 ASIALO IGG+IGM SER IA-ACNC: SIGNIFICANT CHANGE UP (ref 0–50)
GM1 GANGL IGG+IGM SER-ACNC: 15 IV — SIGNIFICANT CHANGE UP (ref 0–50)
GM2 GANGL IGG+IGM SER IA-ACNC: 13 IV — SIGNIFICANT CHANGE UP (ref 0–50)
GQ1B GANGL IGG+IGM SER IA-ACNC: 250 IV — HIGH (ref 0–50)
HCT VFR BLD CALC: 31.4 % — LOW (ref 34.5–45)
HGB BLD-MCNC: 9.8 G/DL — LOW (ref 11.5–15.5)
IANC: 1.69 K/UL — SIGNIFICANT CHANGE UP (ref 1.5–8.5)
IMM GRANULOCYTES NFR BLD AUTO: 0.3 % — SIGNIFICANT CHANGE UP (ref 0–1.5)
LYMPHOCYTES # BLD AUTO: 1.1 K/UL — SIGNIFICANT CHANGE UP (ref 1–3.3)
LYMPHOCYTES # BLD AUTO: 34.8 % — SIGNIFICANT CHANGE UP (ref 13–44)
MAGNESIUM SERPL-MCNC: 2.1 MG/DL — SIGNIFICANT CHANGE UP (ref 1.6–2.6)
MCHC RBC-ENTMCNC: 27.9 PG — SIGNIFICANT CHANGE UP (ref 27–34)
MCHC RBC-ENTMCNC: 31.2 GM/DL — LOW (ref 32–36)
MCV RBC AUTO: 89.5 FL — SIGNIFICANT CHANGE UP (ref 80–100)
MONOCYTES # BLD AUTO: 0.33 K/UL — SIGNIFICANT CHANGE UP (ref 0–0.9)
MONOCYTES NFR BLD AUTO: 10.4 % — SIGNIFICANT CHANGE UP (ref 2–14)
NEUTROPHILS # BLD AUTO: 1.69 K/UL — LOW (ref 1.8–7.4)
NEUTROPHILS NFR BLD AUTO: 53.6 % — SIGNIFICANT CHANGE UP (ref 43–77)
NRBC # BLD: 0 /100 WBCS — SIGNIFICANT CHANGE UP
NRBC # FLD: 0 K/UL — SIGNIFICANT CHANGE UP
PHOSPHATE SERPL-MCNC: 4.1 MG/DL — SIGNIFICANT CHANGE UP (ref 2.5–4.5)
PLATELET # BLD AUTO: 202 K/UL — SIGNIFICANT CHANGE UP (ref 150–400)
POTASSIUM SERPL-MCNC: 4 MMOL/L — SIGNIFICANT CHANGE UP (ref 3.5–5.3)
POTASSIUM SERPL-SCNC: 4 MMOL/L — SIGNIFICANT CHANGE UP (ref 3.5–5.3)
RBC # BLD: 3.51 M/UL — LOW (ref 3.8–5.2)
RBC # FLD: 11.1 % — SIGNIFICANT CHANGE UP (ref 10.3–14.5)
SODIUM SERPL-SCNC: 138 MMOL/L — SIGNIFICANT CHANGE UP (ref 135–145)
SPECIMEN SOURCE: SIGNIFICANT CHANGE UP
WBC # BLD: 3.16 K/UL — LOW (ref 3.8–10.5)
WBC # FLD AUTO: 3.16 K/UL — LOW (ref 3.8–10.5)

## 2021-07-23 PROCEDURE — 99232 SBSQ HOSP IP/OBS MODERATE 35: CPT

## 2021-07-23 RX ORDER — SODIUM CHLORIDE 9 MG/ML
1000 INJECTION, SOLUTION INTRAVENOUS
Refills: 0 | Status: DISCONTINUED | OUTPATIENT
Start: 2021-07-23 | End: 2021-07-24

## 2021-07-23 RX ADMIN — IMMUNE GLOBULIN (HUMAN) 62.5 GRAM(S): 10 INJECTION INTRAVENOUS; SUBCUTANEOUS at 13:44

## 2021-07-23 RX ADMIN — ENOXAPARIN SODIUM 40 MILLIGRAM(S): 100 INJECTION SUBCUTANEOUS at 11:26

## 2021-07-23 RX ADMIN — SODIUM CHLORIDE 100 MILLILITER(S): 9 INJECTION, SOLUTION INTRAVENOUS at 11:25

## 2021-07-23 RX ADMIN — Medication 650 MILLIGRAM(S): at 13:10

## 2021-07-23 RX ADMIN — Medication 650 MILLIGRAM(S): at 13:40

## 2021-07-23 RX ADMIN — Medication 25 MILLIGRAM(S): at 13:10

## 2021-07-23 NOTE — PROGRESS NOTE ADULT - ATTENDING COMMENTS
21 y.o. no PMH R-Handed F p/w difficulty ambulating and LE weakness.  no worsening of symptoms areflexic, mild improvement in IP strength Neurological exam remains largely areflexic and improved strength in all extremities. LP on 7/20 without signs of albuminocytologic dissociation. . CTH on 7/20 was negative for any lesions. MR brain wo contrast and lumbar spine w iv contrast negative for any acute findings     Impression: GBS treatment    Recs:  continue with plan above  outpt EMG/NCS .
21 y.o. no PMH R-Handed F p/w difficulty ambulating and LE weakness.  no worsening of symptoms areflexic, mild improvement in IP strength Neurological exam remains largely areflexic and improved strength in all extremities. LP on 7/20 without signs of albuminocytologic dissociation. . CTH on 7/20 was negative for any lesions. MR brain wo contrast and lumbar spine w iv contrast negative for any acute findings but did note loss of normal lumbar lordosis due to positioning or muscle spasm.     Impression: GBS treatment    Recs:  continue with plan above  outpt EMG/NCS
no

## 2021-07-23 NOTE — PROGRESS NOTE ADULT - SUBJECTIVE AND OBJECTIVE BOX
PROGRESS NOTE:     Michael Lerner  Hospitalist  Pager- [phone]    Patient is a 21y old  Female who presents with a chief complaint of Light headedness, fatigue, weakness, ataxia (21 Jul 2021 11:16)      SUBJECTIVE / OVERNIGHT EVENTS: pt seen and examined by me. Parents at bedside      ADDITIONAL REVIEW OF SYSTEMS:    MEDICATIONS  (STANDING):  acetaminophen   Tablet .. 650 milliGRAM(s) Oral every 24 hours  diphenhydrAMINE 25 milliGRAM(s) Oral every 24 hours  enoxaparin Injectable 40 milliGRAM(s) SubCutaneous daily  immune   globulin 10% (GAMMAGARD) IVPB 25 Gram(s) IV Intermittent every 24 hours    MEDICATIONS  (PRN):  acetaminophen   Tablet .. 650 milliGRAM(s) Oral every 6 hours PRN Mild Pain (1 - 3), Moderate Pain (4 - 6), Severe Pain (7 - 10)      CAPILLARY BLOOD GLUCOSE    I&O's Summary      PHYSICAL EXAM:    Vital Signs Last 24 Hrs  T(C): 36.9 (23 Jul 2021 04:43), Max: 37.1 (22 Jul 2021 13:25)  T(F): 98.5 (23 Jul 2021 04:43), Max: 98.7 (22 Jul 2021 13:25)  HR: 65 (23 Jul 2021 04:43) (65 - 91)  BP: 102/55 (23 Jul 2021 04:43) (100/60 - 111/73)  BP(mean): 84 (22 Jul 2021 17:45) (76 - 84)  RR: 16 (23 Jul 2021 04:43) (16 - 16)  SpO2: 100% (23 Jul 2021 04:43) (99% - 100%)      CONSTITUTIONAL: NAD, well-developed  RESPIRATORY: Normal respiratory effort; lungs are clear to auscultation bilaterally  CARDIOVASCULAR: Regular rate and rhythm, normal S1 and S2, no murmur/rub/gallop; No lower extremity edema; Peripheral pulses are 2+ bilaterally  ABDOMEN: Nontender to palpation, normoactive bowel sounds, no rebound/guarding; No hepatosplenomegaly  MUSCLOSKELETAL: no clubbing or cyanosis of digits; no joint swelling or tenderness to palpation  PSYCH: A+O to person, place, and time; affect appropriate  NEURO: AAOx 3, speech fluent ,sensation intact, BLE- strength 4/5  SKIN: no rash     LABS:                          9.8    3.16  )-----------( 202      ( 23 Jul 2021 07:44 )             31.4     07-23    138  |  103  |  11  ----------------------------<  85  4.0   |  24  |  0.58    Ca    9.2      23 Jul 2021 07:44  Phos  4.1     07-23  Mg     2.10     07-23        Culture - CSF with Gram Stain (collected 20 Jul 2021 10:40)  Source: .CSF CSF  Gram Stain (20 Jul 2021 12:00):    No polymorphonuclear leukocytes seen    No organisms seen    by cytocentrifuge  Preliminary Report (21 Jul 2021 06:48):    No growth        RADIOLOGY & ADDITIONAL TESTS:  Results Reviewed:   Imaging Personally Reviewed:  Electrocardiogram Personally Reviewed:    COORDINATION OF CARE:  Care Discussed with Consultants/Other Providers [Y/N]:  Prior or Outpatient Records Reviewed [Y/N]: Neuro  Neuro    PROGRESS NOTE:     Michael Lerner  Hospitalist  Pager- [phone]    Patient is a 21y old  Female who presents with a chief complaint of Light headedness, fatigue, weakness, ataxia (21 Jul 2021 11:16)      SUBJECTIVE / OVERNIGHT EVENTS: pt seen and examined by me.   Feels very tired after receiving the IVIG in the afternoon   Feels better in AM  Lightheadedness present       ADDITIONAL REVIEW OF SYSTEMS:    MEDICATIONS  (STANDING):  acetaminophen   Tablet .. 650 milliGRAM(s) Oral every 24 hours  diphenhydrAMINE 25 milliGRAM(s) Oral every 24 hours  enoxaparin Injectable 40 milliGRAM(s) SubCutaneous daily  immune   globulin 10% (GAMMAGARD) IVPB 25 Gram(s) IV Intermittent every 24 hours    MEDICATIONS  (PRN):  acetaminophen   Tablet .. 650 milliGRAM(s) Oral every 6 hours PRN Mild Pain (1 - 3), Moderate Pain (4 - 6), Severe Pain (7 - 10)      CAPILLARY BLOOD GLUCOSE    I&O's Summary      PHYSICAL EXAM:    Vital Signs Last 24 Hrs  T(C): 36.9 (23 Jul 2021 04:43), Max: 37.1 (22 Jul 2021 13:25)  T(F): 98.5 (23 Jul 2021 04:43), Max: 98.7 (22 Jul 2021 13:25)  HR: 65 (23 Jul 2021 04:43) (65 - 91)  BP: 102/55 (23 Jul 2021 04:43) (100/60 - 111/73)  BP(mean): 84 (22 Jul 2021 17:45) (76 - 84)  RR: 16 (23 Jul 2021 04:43) (16 - 16)  SpO2: 100% (23 Jul 2021 04:43) (99% - 100%)      CONSTITUTIONAL: NAD, well-developed  RESPIRATORY: Normal respiratory effort; lungs are clear to auscultation bilaterally  CARDIOVASCULAR: Regular rate and rhythm, normal S1 and S2, no murmur/rub/gallop; No lower extremity edema; Peripheral pulses are 2+ bilaterally  ABDOMEN: Nontender to palpation, normoactive bowel sounds, no rebound/guarding; No hepatosplenomegaly  MUSCLOSKELETAL: no clubbing or cyanosis of digits; no joint swelling or tenderness to palpation  PSYCH: A+O to person, place, and time; affect appropriate  NEURO: AAOx 3, speech fluent ,sensation intact, BLE- strength 4/5  SKIN: no rash     LABS:                          9.8    3.16  )-----------( 202      ( 23 Jul 2021 07:44 )             31.4     07-23    138  |  103  |  11  ----------------------------<  85  4.0   |  24  |  0.58    Ca    9.2      23 Jul 2021 07:44  Phos  4.1     07-23  Mg     2.10     07-23        Culture - CSF with Gram Stain (collected 20 Jul 2021 10:40)  Source: .CSF CSF  Gram Stain (20 Jul 2021 12:00):    No polymorphonuclear leukocytes seen    No organisms seen    by cytocentrifuge  Preliminary Report (21 Jul 2021 06:48):    No growth        RADIOLOGY & ADDITIONAL TESTS:  Results Reviewed:   Imaging Personally Reviewed:  Electrocardiogram Personally Reviewed:    COORDINATION OF CARE:  Care Discussed with Consultants/Other Providers [Y/N]:  Prior or Outpatient Records Reviewed [Y/N]: Neuro  Neuro    PROGRESS NOTE:     Michael Lerner  Hospitalist  Pager- [phone]    Patient is a 21y old  Female who presents with a chief complaint of Light headedness, fatigue, weakness, ataxia (21 Jul 2021 11:16)      SUBJECTIVE / OVERNIGHT EVENTS: pt seen and examined by me.   Feels very tired after receiving the IVIG in the afternoon   Feels better in AM  Lightheadedness present  but able to ambulate to the bathroom       ADDITIONAL REVIEW OF SYSTEMS:    MEDICATIONS  (STANDING):  acetaminophen   Tablet .. 650 milliGRAM(s) Oral every 24 hours  diphenhydrAMINE 25 milliGRAM(s) Oral every 24 hours  enoxaparin Injectable 40 milliGRAM(s) SubCutaneous daily  immune   globulin 10% (GAMMAGARD) IVPB 25 Gram(s) IV Intermittent every 24 hours    MEDICATIONS  (PRN):  acetaminophen   Tablet .. 650 milliGRAM(s) Oral every 6 hours PRN Mild Pain (1 - 3), Moderate Pain (4 - 6), Severe Pain (7 - 10)      CAPILLARY BLOOD GLUCOSE    I&O's Summary      PHYSICAL EXAM:    Vital Signs Last 24 Hrs  T(C): 36.9 (23 Jul 2021 04:43), Max: 37.1 (22 Jul 2021 13:25)  T(F): 98.5 (23 Jul 2021 04:43), Max: 98.7 (22 Jul 2021 13:25)  HR: 65 (23 Jul 2021 04:43) (65 - 91)  BP: 102/55 (23 Jul 2021 04:43) (100/60 - 111/73)  BP(mean): 84 (22 Jul 2021 17:45) (76 - 84)  RR: 16 (23 Jul 2021 04:43) (16 - 16)  SpO2: 100% (23 Jul 2021 04:43) (99% - 100%)      CONSTITUTIONAL: NAD, well-developed  RESPIRATORY: Normal respiratory effort; lungs are clear to auscultation bilaterally  CARDIOVASCULAR: Regular rate and rhythm, normal S1 and S2, no murmur/rub/gallop; No lower extremity edema; Peripheral pulses are 2+ bilaterally  ABDOMEN: Nontender to palpation, normoactive bowel sounds, no rebound/guarding; No hepatosplenomegaly  MUSCLOSKELETAL: no clubbing or cyanosis of digits; no joint swelling or tenderness to palpation  PSYCH: A+O to person, place, and time; affect appropriate  NEURO: AAOx 3, speech fluent ,sensation intact, BLE- strength 5/5  SKIN: no rash     LABS:                          9.8    3.16  )-----------( 202      ( 23 Jul 2021 07:44 )             31.4     07-23    138  |  103  |  11  ----------------------------<  85  4.0   |  24  |  0.58    Ca    9.2      23 Jul 2021 07:44  Phos  4.1     07-23  Mg     2.10     07-23        Culture - CSF with Gram Stain (collected 20 Jul 2021 10:40)  Source: .CSF CSF  Gram Stain (20 Jul 2021 12:00):    No polymorphonuclear leukocytes seen    No organisms seen    by cytocentrifuge  Preliminary Report (21 Jul 2021 06:48):    No growth        RADIOLOGY & ADDITIONAL TESTS:  Results Reviewed:   Imaging Personally Reviewed:  Electrocardiogram Personally Reviewed:    COORDINATION OF CARE:  Care Discussed with Consultants/Other Providers [Y/N]:  Prior or Outpatient Records Reviewed [Y/N]: Neuro  Neuro

## 2021-07-23 NOTE — PROGRESS NOTE ADULT - PROBLEM SELECTOR PLAN 1
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 such as Guillain-Barré syndrome, possible variant of Corado-Quan Syndrome   On  IVIG 2g/kg over 4 days till 7/23  FU Lyme studies/ Oligoclonal bands   FU  ganglioside antibodies and Gq1b (in process)  NIF q6 ordered for close monitoring as it is extremely important for pt with GBS to have monitor for sudden changes in respiratory status   PMR- Acute rehab 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

## 2021-07-23 NOTE — PROGRESS NOTE ADULT - PROBLEM SELECTOR PLAN 2
with Anemia   Will order iron studies with AM labs   continue to monitor Since the onset of illness  Improving from onset but still feels very tired, lightheaded, not herself  Able to ambulate to the bathroom and back with assist  Will check Orthostatics   Will start  IVF Since the onset of illness  Improving from onset but still feels very tired, lightheaded, not herself  Able to ambulate to the bathroom and back with assist  Orthostatics positive for HR   Will start  IVF  Repeat Orthostatics in AM

## 2021-07-23 NOTE — PROGRESS NOTE ADULT - PROBLEM SELECTOR PLAN 4
SCDs  Dispo- Last day of IVIG this evening.  Dc planning to home pending improvement in lightheadedness and FU AM labs for pancytopenia SCDs  Dispo- Last day of IVIG this evening.  Dc planning to home pending improvement in lightheadedness, orthostasis  and FU AM labs for pancytopenia SCDs  Dispo- Last day of IVIG this evening.  Dc planning to home pending improvement in lightheadedness, orthostasis  and FU AM labs for pancytopenia  FU  with Dr. King for further nerve conduction test within 2 weeks of discharge [phone]. [address] [place] [address]

## 2021-07-23 NOTE — PROGRESS NOTE ADULT - SUBJECTIVE AND OBJECTIVE BOX
Patient is a 21y old  Female who presents with a chief complaint of Light headedness, fatigue, weakness, ataxia (23 Jul 2021 11:59)      HPI:  21F no PMHx presenting with lightheadedness, fatigue, and unsteady gait for the past 3 days (since Saturday). Patient states she was was in her usual state of health and felt fine on Friday. When she woke up Saturday, she noticed she felt very tired, lethargic and dizzy when she stood up. It was difficult for her to walk straight without holding onto something for support. Her symptoms became worse yesterday, specifically with reduced sensation and strength in her lower extremities. She endorses sensation of numbness in her finger tips as well, and feels as though her arms are weaker than normal, but not as weak as her legs. She states that when she walks her dad has to hold her up and she sways back and forth and she feel as though she does not have the strength in her legs to support her weight. She noticed her legs shaking slightly yesterday. She admits to decreased appetite since yesterday as well with decreased PO intake. She denies changes in her hearing or vision, headache, nausea, vomiting, bowel or bladder incontinence. She denies CP, SOB, abdominal pain, constipation, diarrhea, difficulty urinating, dysuria, fever, chills, nasal congestion. She does endorse dry cough worse in the morning time x 2 weeks as well as sensation of abdominal bloating yesterday that has since resolved. In the ED, no meds were given. She was evaluated by neurology and LP was performed. Vital signs were stable. (20 Jul 2021 08:18)    states she slept well overnight but still feeling sleepy today. ambulated to the bathroom with assistance.      REVIEW OF SYSTEMS  Constitutional - No fever, No weight loss, + fatigue  HEENT - No eye pain, No visual disturbances, No difficulty hearing, No tinnitus, No vertigo, No neck pain  Respiratory - No cough, No wheezing, No shortness of breath  Cardiovascular - No chest pain, No palpitations  Gastrointestinal - No abdominal pain, No nausea, No vomiting, No diarrhea, No constipation  Genitourinary - No dysuria, No frequency, No hematuria, No incontinence  Neurological - No headaches, No memory loss, + loss of strength, No numbness, No tremors  Skin - No itching, No rashes, No lesions   Endocrine - No temperature intolerance  Musculoskeletal - No joint pain, No joint swelling, No muscle pain  Psychiatric - No depression, No anxiety    PAST MEDICAL & SURGICAL HISTORY  No pertinent past medical history    No significant past surgical history        SOCIAL HISTORY  Smoking - Denied  EtOH - Denied   Drugs - Denied    FUNCTIONAL HISTORY  Lives in private house with steps to enter, lives with parents  Independent     CURRENT FUNCTIONAL STATUS  7/22  bm: supervision  t: cg  gait: cg 50'         FAMILY HISTORY   No pertinent family history in first degree relatives      RECENT LABS/IMAGING  CBC Full  -  ( 23 Jul 2021 07:44 )  WBC Count : 3.16 K/uL  RBC Count : 3.51 M/uL  Hemoglobin : 9.8 g/dL  Hematocrit : 31.4 %  Platelet Count - Automated : 202 K/uL  Mean Cell Volume : 89.5 fL  Mean Cell Hemoglobin : 27.9 pg  Mean Cell Hemoglobin Concentration : 31.2 gm/dL  Auto Neutrophil # : 1.69 K/uL  Auto Lymphocyte # : 1.10 K/uL  Auto Monocyte # : 0.33 K/uL  Auto Eosinophil # : 0.02 K/uL  Auto Basophil # : 0.01 K/uL  Auto Neutrophil % : 53.6 %  Auto Lymphocyte % : 34.8 %  Auto Monocyte % : 10.4 %  Auto Eosinophil % : 0.6 %  Auto Basophil % : 0.3 %    07-23    138  |  103  |  11  ----------------------------<  85  4.0   |  24  |  0.58    Ca    9.2      23 Jul 2021 07:44  Phos  4.1     07-23  Mg     2.10     07-23          VITALS  T(C): 37 (07-23-21 @ 13:40), Max: 37.1 (07-22-21 @ 17:45)  HR: 72 (07-23-21 @ 13:40) (65 - 91)  BP: 102/57 (07-23-21 @ 13:40) (102/55 - 111/73)  RR: 16 (07-23-21 @ 13:40) (16 - 16)  SpO2: 100% (07-23-21 @ 13:40) (100% - 100%)  Wt(kg): --    ALLERGIES  No Known Drug Allergies  Pollen (Unknown)      MEDICATIONS   acetaminophen   Tablet .. 650 milliGRAM(s) Oral every 6 hours PRN  enoxaparin Injectable 40 milliGRAM(s) SubCutaneous daily  lactated ringers. 1000 milliLiter(s) IV Continuous <Continuous>      ----------------------------------------------------------------------------------------   PHYSICAL EXAM  Constitutional - NAD, Comfortable, sleepy but awakens to voice  HEENT - NCAT   Neck - Supple, No limited ROM  Chest - no respiratory distress  Cardiovascular - RRR, S1S2   Abdomen -  Soft, NTND  Extremities - No C/C/E, No calf tenderness   Neurologic Exam -                    Cognitive - Awake, Alert, AAO to self, place, date, year, situation     Communication - Fluent, No dysarthria     Cranial Nerves - CN 2-12 intact     Motor -                      LEFT    UE - 4+/5                    RIGHT UE - 4+/5                    LEFT    LE - 5/5                    RIGHT LE -  5/5     Sensory - Intact to LT  Psychiatric - Mood stable, Affect WNL  ----------------------------------------------------------------------------------------  ASSESSMENT/PLAN 22 yo f admitted with weakness, found to have gbs. Receiving IVIG (day 5)  LP done, receiving IVIG  Pain -tylenol prn  DVT PPX - scd, lovenox  Diet: regular  continue bedside PT and OT for bed mobility, transfers, ambulation with Ad, adls  Rehab - goal is for home discharge with outpatient PT vs home care when medically cleared. Recommend stair clearance prior to discharge, patient states she hopes to go home tomorrow. She states her family can provide assistance if needed       Patient is a 21y old  Female who presents with a chief complaint of Light headedness, fatigue, weakness, ataxia (23 Jul 2021 11:59)      HPI:  21F no PMHx presenting with lightheadedness, fatigue, and unsteady gait for the past 3 days (since Saturday). Patient states she was was in her usual state of health and felt fine on Friday. When she woke up Saturday, she noticed she felt very tired, lethargic and dizzy when she stood up. It was difficult for her to walk straight without holding onto something for support. Her symptoms became worse yesterday, specifically with reduced sensation and strength in her lower extremities. She endorses sensation of numbness in her finger tips as well, and feels as though her arms are weaker than normal, but not as weak as her legs. She states that when she walks her dad has to hold her up and she sways back and forth and she feel as though she does not have the strength in her legs to support her weight. She noticed her legs shaking slightly yesterday. She admits to decreased appetite since yesterday as well with decreased PO intake. She denies changes in her hearing or vision, headache, nausea, vomiting, bowel or bladder incontinence. She denies CP, SOB, abdominal pain, constipation, diarrhea, difficulty urinating, dysuria, fever, chills, nasal congestion. She does endorse dry cough worse in the morning time x 2 weeks as well as sensation of abdominal bloating yesterday that has since resolved. In the ED, no meds were given. She was evaluated by neurology and LP was performed. Vital signs were stable. (20 Jul 2021 08:18)    states she slept well overnight but still feeling sleepy today. ambulated to the bathroom with assistance.      REVIEW OF SYSTEMS  Constitutional - No fever, No weight loss, + fatigue  HEENT - No eye pain, No visual disturbances, No difficulty hearing, No tinnitus, No vertigo, No neck pain  Respiratory - No cough, No wheezing, No shortness of breath  Cardiovascular - No chest pain, No palpitations  Gastrointestinal - No abdominal pain, No nausea, No vomiting, No diarrhea, No constipation  Genitourinary - No dysuria, No frequency, No hematuria, No incontinence  Neurological - No headaches, No memory loss, + loss of strength, No numbness, No tremors  Skin - No itching, No rashes, No lesions   Endocrine - No temperature intolerance  Musculoskeletal - No joint pain, No joint swelling, No muscle pain  Psychiatric - No depression, No anxiety    PAST MEDICAL & SURGICAL HISTORY  No pertinent past medical history    No significant past surgical history        SOCIAL HISTORY  Smoking - Denied  EtOH - Denied   Drugs - Denied    FUNCTIONAL HISTORY  Lives in private house with steps to enter, lives with parents  Independent     CURRENT FUNCTIONAL STATUS  7/22  bm: supervision  t: cg  gait: cg 50'         FAMILY HISTORY   No pertinent family history in first degree relatives      RECENT LABS/IMAGING  CBC Full  -  ( 23 Jul 2021 07:44 )  WBC Count : 3.16 K/uL  RBC Count : 3.51 M/uL  Hemoglobin : 9.8 g/dL  Hematocrit : 31.4 %  Platelet Count - Automated : 202 K/uL  Mean Cell Volume : 89.5 fL  Mean Cell Hemoglobin : 27.9 pg  Mean Cell Hemoglobin Concentration : 31.2 gm/dL  Auto Neutrophil # : 1.69 K/uL  Auto Lymphocyte # : 1.10 K/uL  Auto Monocyte # : 0.33 K/uL  Auto Eosinophil # : 0.02 K/uL  Auto Basophil # : 0.01 K/uL  Auto Neutrophil % : 53.6 %  Auto Lymphocyte % : 34.8 %  Auto Monocyte % : 10.4 %  Auto Eosinophil % : 0.6 %  Auto Basophil % : 0.3 %    07-23    138  |  103  |  11  ----------------------------<  85  4.0   |  24  |  0.58    Ca    9.2      23 Jul 2021 07:44  Phos  4.1     07-23  Mg     2.10     07-23          VITALS  T(C): 37 (07-23-21 @ 13:40), Max: 37.1 (07-22-21 @ 17:45)  HR: 72 (07-23-21 @ 13:40) (65 - 91)  BP: 102/57 (07-23-21 @ 13:40) (102/55 - 111/73)  RR: 16 (07-23-21 @ 13:40) (16 - 16)  SpO2: 100% (07-23-21 @ 13:40) (100% - 100%)  Wt(kg): --    ALLERGIES  No Known Drug Allergies  Pollen (Unknown)      MEDICATIONS   acetaminophen   Tablet .. 650 milliGRAM(s) Oral every 6 hours PRN  enoxaparin Injectable 40 milliGRAM(s) SubCutaneous daily  lactated ringers. 1000 milliLiter(s) IV Continuous <Continuous>      ----------------------------------------------------------------------------------------   PHYSICAL EXAM  Constitutional - NAD, Comfortable, sleepy but awakens to voice  HEENT - NCAT   Neck - Supple, No limited ROM  Chest - no respiratory distress  Cardiovascular - RRR, S1S2   Abdomen -  Soft, NTND  Extremities - No C/C/E, No calf tenderness   Neurologic Exam -                    Cognitive - Awake, Alert, AAO to self, place, date, year, situation     Communication - Fluent, No dysarthria     Cranial Nerves - CN 2-12 intact     Motor -                      LEFT    UE - 4+/5                    RIGHT UE - 4+/5                    LEFT    LE - 5/5                    RIGHT LE -  5/5     Sensory - Intact to LT  Psychiatric - Mood stable, Affect WNL  ----------------------------------------------------------------------------------------  ASSESSMENT/PLAN 20 yo f admitted with weakness, found to have gbs. Receiving IVIG (4/4)  LP done, receiving IVIG  Pain -tylenol prn  DVT PPX - scd, lovenox  Diet: regular  continue bedside PT and OT for bed mobility, transfers, ambulation with Ad, adls  Rehab - goal is for home discharge with outpatient PT vs home care when medically cleared. Recommend stair clearance prior to discharge, patient states she hopes to go home tomorrow. She states her family can provide assistance if needed

## 2021-07-23 NOTE — PROGRESS NOTE ADULT - ASSESSMENT
21 y.o. R-Handed F with no reported past medical history who is presenting with difficulty ambulating and LE weakness. Neurological exam remains largely areflexic and improved strength in all extremities. LP on 7/20 without signs of albuminocytologic dissociation. NIF maintained within normal -35 to -50 with good effort. CTH on 7/20 was negative for any lesions. MR brain wo contrast and lumbar spine w iv contrast negative for any acute findings but did note loss of normal lumbar lordosis due to positioning or muscle spasm.     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 Tolerating IVIG tx with no acute worsening of neuromuscular or respiratory status    Recs:  [x] CT brain, MR brain, MR lumbar spine  [x] LP to check for albuminocytologic dissociation  [] f/u Ganglioside antibodies, Gq1b  [] c/w IVIG 2g/kg over 4 days; completed 3/4 treatments; can pre-medication with Tylenol 325mg and Benadryl 25mg po 30 minutes prior to administration  [] c/w NIF q6, encouraged pt for participation  [] c/w PT/OT  [] fall precautions  [] No contraindication for dc after completing 4th dose of IVIG. Can f/u with Dr. King for further nerve conduction test within 2 weeks of discharge [phone]. [address] [place] [address]      Discussed with neurology attending, Dr. King.  Detail Level: Simple 21 y.o. R-Handed F with no reported past medical history who is presenting with difficulty ambulating and LE weakness. Neurological exam remains largely areflexic and improved strength in all extremities. LP on 7/20 without signs of albuminocytologic dissociation. NIF maintained within normal -35 to -50 with good effort. CTH on 7/20 was negative for any lesions. MR brain wo contrast and lumbar spine w iv contrast negative for any acute findings but did note loss of normal lumbar lordosis due to positioning or muscle spasm.     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 Tolerating IVIG tx with no acute worsening of neuromuscular or respiratory status    Recs:  [x] CT brain, MR brain, MR lumbar spine  [x] LP to check for albuminocytologic dissociation  [x] Gq1b ab +  [] c/w IVIG 2g/kg over 4 days; completed 3/4 treatments; can pre-medication with Tylenol 325mg and Benadryl 25mg po 30 minutes prior to administration  [] c/w NIF q6, encouraged pt for participation  [] c/w PT/OT  [] fall precautions  [] No contraindication for dc after completing 4th dose of IVIG. Can f/u with Dr. King for further nerve conduction test within 2 weeks of discharge [phone]. [address] [place] [address]      Discussed with neurology attending, Dr. King.  Comment: Mother

## 2021-07-23 NOTE — PROGRESS NOTE ADULT - PROBLEM SELECTOR PLAN 3
SCDs with Anemia   Completed her menstrual cycle this monday   Denies bleeding from any site. Denies melena  Will order iron studies with AM labs   continue to monitor with Anemia   Completed her menstrual cycle this monday   Denies bleeding from any site. Denies melena  Will order iron studies with AM labs   continue to monitor  Dad reports he has history of Alpha thal trait . He is not sure about pt

## 2021-07-23 NOTE — PROGRESS NOTE ADULT - SUBJECTIVE AND OBJECTIVE BOX
SUBJECTIVE/INTERVAL HISTORY:  - NIF maintained within normal -35 to -50 with good effort  - negative orthostatics  - awaiting 4/4 IVIG   - no acute events overnight    PAST MEDICAL & SURGICAL HISTORY:  No pertinent past medical history    No significant past surgical history      MEDICATIONS (HOME):  Home Medications:  Retin-A 0.1% topical cream: Apply topically to affected area once a day (at bedtime) (20 Jul 2021 07:57)    MEDICATIONS  (STANDING):  acetaminophen   Tablet .. 650 milliGRAM(s) Oral every 24 hours  diphenhydrAMINE 25 milliGRAM(s) Oral every 24 hours  enoxaparin Injectable 40 milliGRAM(s) SubCutaneous daily  immune   globulin 10% (GAMMAGARD) IVPB 25 Gram(s) IV Intermittent every 24 hours  lactated ringers. 1000 milliLiter(s) (100 mL/Hr) IV Continuous <Continuous>    MEDICATIONS  (PRN):  acetaminophen   Tablet .. 650 milliGRAM(s) Oral every 6 hours PRN Mild Pain (1 - 3), Moderate Pain (4 - 6), Severe Pain (7 - 10)    ALLERGIES/INTOLERANCES:  Allergies  No Known Drug Allergies  Pollen (Unknown)    Intolerances    VITALS & EXAMINATION:  Vital Signs Last 24 Hrs  T(C): 36.9 (23 Jul 2021 04:43), Max: 37.1 (22 Jul 2021 13:25)  T(F): 98.5 (23 Jul 2021 04:43), Max: 98.7 (22 Jul 2021 13:25)  HR: 65 (23 Jul 2021 04:43) (65 - 91)  BP: 102/55 (23 Jul 2021 04:43) (100/60 - 111/73)  BP(mean): 84 (22 Jul 2021 17:45) (76 - 84)  RR: 16 (23 Jul 2021 04:43) (16 - 16)  SpO2: 100% (23 Jul 2021 04:43) (99% - 100%)    General:  Constitutional: normal weight Female, appears stated age, in no apparent distress including pain but feels generalized weakness  Head: Normocephalic & atraumatic    Neurological (>12):  MS: Awake, alert, oriented to person, place, situation, time. Normal affect. Follows all commands.    Language: no dysarthria     Motor: Normal muscle bulk & tone. No noticeable tremor or seizure. Neck flexion strength normal              Deltoid	Biceps	Triceps	Wrist	Finger ABd	   R	5	5	5	5	5		4 	  L	5	5	5	5	5		4    	H-Flex	H-Ext	K-Flex	K-Ext	D-Flex	P-Flex  R	5	5	5	5	5	5		 	   L	5	5	5	5	5	5			     Sensation: Intact to light touch throughout extremities      Reflexes:              Biceps(C5)       BR(C6)     Triceps(C7)               Patellar(L4)    Achilles(S1)      R	0	          0	             0		        0		    0		   L	0	          1	             0		        0		    0	    Coordination: no ataxia for FTN test    Gait: deferred but noted to be able to walk to hallway with one person assistance    LABORATORY:  CBC                       9.8    3.16  )-----------( 202      ( 23 Jul 2021 07:44 )             31.4     Chem 07-23    138  |  103  |  11  ----------------------------<  85  4.0   |  24  |  0.58    Ca    9.2      23 Jul 2021 07:44  Phos  4.1     07-23  Mg     2.10     07-23      Awaiting Gq1b IgG, lyme panel and oligoclonal bands on csf

## 2021-07-24 VITALS
OXYGEN SATURATION: 100 % | RESPIRATION RATE: 18 BRPM | TEMPERATURE: 98 F | SYSTOLIC BLOOD PRESSURE: 92 MMHG | HEART RATE: 81 BPM | DIASTOLIC BLOOD PRESSURE: 57 MMHG

## 2021-07-24 LAB
ANION GAP SERPL CALC-SCNC: 12 MMOL/L — SIGNIFICANT CHANGE UP (ref 7–14)
BASOPHILS # BLD AUTO: 0.01 K/UL — SIGNIFICANT CHANGE UP (ref 0–0.2)
BASOPHILS NFR BLD AUTO: 0.3 % — SIGNIFICANT CHANGE UP (ref 0–2)
BUN SERPL-MCNC: 10 MG/DL — SIGNIFICANT CHANGE UP (ref 7–23)
CALCIUM SERPL-MCNC: 9.1 MG/DL — SIGNIFICANT CHANGE UP (ref 8.4–10.5)
CHLORIDE SERPL-SCNC: 100 MMOL/L — SIGNIFICANT CHANGE UP (ref 98–107)
CO2 SERPL-SCNC: 24 MMOL/L — SIGNIFICANT CHANGE UP (ref 22–31)
CREAT SERPL-MCNC: 0.55 MG/DL — SIGNIFICANT CHANGE UP (ref 0.5–1.3)
EOSINOPHIL # BLD AUTO: 0.03 K/UL — SIGNIFICANT CHANGE UP (ref 0–0.5)
EOSINOPHIL NFR BLD AUTO: 0.8 % — SIGNIFICANT CHANGE UP (ref 0–6)
FERRITIN SERPL-MCNC: 122 NG/ML — SIGNIFICANT CHANGE UP (ref 15–150)
FOLATE SERPL-MCNC: 13.3 NG/ML — SIGNIFICANT CHANGE UP (ref 3.1–17.5)
GLUCOSE SERPL-MCNC: 75 MG/DL — SIGNIFICANT CHANGE UP (ref 70–99)
HCT VFR BLD CALC: 33.3 % — LOW (ref 34.5–45)
HGB BLD-MCNC: 9.9 G/DL — LOW (ref 11.5–15.5)
IANC: 2.22 K/UL — SIGNIFICANT CHANGE UP (ref 1.5–8.5)
IMM GRANULOCYTES NFR BLD AUTO: 0.5 % — SIGNIFICANT CHANGE UP (ref 0–1.5)
IRON SATN MFR SERPL: 22 % — SIGNIFICANT CHANGE UP (ref 14–50)
IRON SATN MFR SERPL: 52 UG/DL — SIGNIFICANT CHANGE UP (ref 30–160)
LYMPHOCYTES # BLD AUTO: 1.18 K/UL — SIGNIFICANT CHANGE UP (ref 1–3.3)
LYMPHOCYTES # BLD AUTO: 30.8 % — SIGNIFICANT CHANGE UP (ref 13–44)
MAGNESIUM SERPL-MCNC: 2 MG/DL — SIGNIFICANT CHANGE UP (ref 1.6–2.6)
MCHC RBC-ENTMCNC: 27.3 PG — SIGNIFICANT CHANGE UP (ref 27–34)
MCHC RBC-ENTMCNC: 29.7 GM/DL — LOW (ref 32–36)
MCV RBC AUTO: 92 FL — SIGNIFICANT CHANGE UP (ref 80–100)
MONOCYTES # BLD AUTO: 0.37 K/UL — SIGNIFICANT CHANGE UP (ref 0–0.9)
MONOCYTES NFR BLD AUTO: 9.7 % — SIGNIFICANT CHANGE UP (ref 2–14)
NEUTROPHILS # BLD AUTO: 2.22 K/UL — SIGNIFICANT CHANGE UP (ref 1.8–7.4)
NEUTROPHILS NFR BLD AUTO: 57.9 % — SIGNIFICANT CHANGE UP (ref 43–77)
NRBC # BLD: 0 /100 WBCS — SIGNIFICANT CHANGE UP
NRBC # FLD: 0 K/UL — SIGNIFICANT CHANGE UP
PHOSPHATE SERPL-MCNC: 3.5 MG/DL — SIGNIFICANT CHANGE UP (ref 2.5–4.5)
PLATELET # BLD AUTO: 201 K/UL — SIGNIFICANT CHANGE UP (ref 150–400)
POTASSIUM SERPL-MCNC: 3.9 MMOL/L — SIGNIFICANT CHANGE UP (ref 3.5–5.3)
POTASSIUM SERPL-SCNC: 3.9 MMOL/L — SIGNIFICANT CHANGE UP (ref 3.5–5.3)
RBC # BLD: 3.62 M/UL — LOW (ref 3.8–5.2)
RBC # FLD: 10.8 % — SIGNIFICANT CHANGE UP (ref 10.3–14.5)
SODIUM SERPL-SCNC: 136 MMOL/L — SIGNIFICANT CHANGE UP (ref 135–145)
TIBC SERPL-MCNC: 236 UG/DL — SIGNIFICANT CHANGE UP (ref 220–430)
UIBC SERPL-MCNC: 184 UG/DL — SIGNIFICANT CHANGE UP (ref 110–370)
VIT B12 SERPL-MCNC: 614 PG/ML — SIGNIFICANT CHANGE UP (ref 200–900)
WBC # BLD: 3.83 K/UL — SIGNIFICANT CHANGE UP (ref 3.8–10.5)
WBC # FLD AUTO: 3.83 K/UL — SIGNIFICANT CHANGE UP (ref 3.8–10.5)

## 2021-07-24 PROCEDURE — 99239 HOSP IP/OBS DSCHRG MGMT >30: CPT

## 2021-07-24 RX ADMIN — ENOXAPARIN SODIUM 40 MILLIGRAM(S): 100 INJECTION SUBCUTANEOUS at 12:34

## 2021-07-24 NOTE — PROGRESS NOTE ADULT - PROBLEM SELECTOR PLAN 4
SCDs  FU  with Dr. King for further nerve conduction test within 2 weeks of discharge [phone]. [address] [place] [address]    Discussed with Father and pt at bedside in detail about importance of following up with Neurology w/in 2 weeks - they state they will call Neurology office Monday morning. Explained to patient that she needs to drink adequate fluids given orthostatics positive by HR. She states she will increase water intake. Her family will help her walk around and her mother will help her bathe. She should remain supervised at home and should not be traveling and not over exert herself. She complains of unable to focus her vision, wears glasses at home but not the same, denies vision loss or eye pain. Plan to f/u with Neurology and if no improvement will see opthalmology (will give referral on d/c). Pt advised if worsen weakness, any signs SOB or trouble breathing or vision loss to return to the hospital.     DISPO PLANNIN mins

## 2021-07-24 NOTE — DISCHARGE NOTE PROVIDER - NSDCFUADDAPPT_GEN_ALL_CORE_FT
Follow up with PCP within 1-2 weeks of discharge. If you are in need of a general medicine physician and post-discharge medical follow-up for further care/recommendations you may contact the [facility] for an appointment at [phone].     Follow up with neurology within 1-2 weeks of discharge. Follow up with Dr. King for further nerve conduction test within 2 weeks of discharge [phone] at [address] [place] [address]. Call for an appointment.  Follow up with PCP within 1-2 weeks of discharge. If you are in need of a general medicine physician and post-discharge medical follow-up for further care/recommendations you may contact the [facility] for an appointment at [phone]. You need further workup for anemia, given your dad's history of thalassemia.     Follow up with neurology within 1-2 weeks of discharge. Follow up with Dr. King for further nerve conduction test within 2 weeks of discharge [phone] at [address] [place] [address]. Call for an appointment.     You should follow-up with your ophthalmologist or with [facility] Department of Ophthalmology within 1 week of after discharge at:  [address]  [place] [address]  Call [phone] to schedule an appointment.

## 2021-07-24 NOTE — DISCHARGE NOTE NURSING/CASE MANAGEMENT/SOCIAL WORK - NSDCFUADDAPPT_GEN_ALL_CORE_FT
Follow up with PCP within 1-2 weeks of discharge. If you are in need of a general medicine physician and post-discharge medical follow-up for further care/recommendations you may contact the [facility] for an appointment at [phone]. You need further workup for anemia, given your dad's history of thalassemia.     Follow up with neurology within 1-2 weeks of discharge. Follow up with Dr. King for further nerve conduction test within 2 weeks of discharge [phone] at [address] [place] [address]. Call for an appointment.     You should follow-up with your ophthalmologist or with [facility] Department of Ophthalmology within 1 week of after discharge at:  [address]  [place] [address]  Call [phone] to schedule an appointment.

## 2021-07-24 NOTE — DISCHARGE NOTE PROVIDER - CARE PROVIDER_API CALL
Sampson King)  Neurology  [address]  [place] [address]  Phone: [phone]  Fax: [phone]  Follow Up Time:

## 2021-07-24 NOTE — DISCHARGE NOTE NURSING/CASE MANAGEMENT/SOCIAL WORK - PATIENT PORTAL LINK FT
You can access the FollowMyHealth Patient Portal offered by [facility] by registering at the following website: [email or web address]. By joining [employer]’s FollowMyHealth portal, you will also be able to view your health information using other applications (apps) compatible with our system.

## 2021-07-24 NOTE — PROGRESS NOTE ADULT - PROBLEM SELECTOR PLAN 2
Since the onset of illness  Improving from onset but still feels very tired, lightheaded, not herself  Able to ambulate to the bathroom and back with assist  Orthostatics positive for HR, received IVF, encourage increase PO intake

## 2021-07-24 NOTE — PROGRESS NOTE ADULT - SUBJECTIVE AND OBJECTIVE BOX
Patient is a 21y old  Female who presents with a chief complaint of Light headedness, fatigue, weakness, ataxia (24 Jul 2021 10:35)    [facility]   Erlinda Moreno DO   Pager: [phone]  Cell: [phone]    SUBJECTIVE / OVERNIGHT EVENTS: Pt seen and examined this morning. Complaining of feeling fatigue and difficulty focusing her vision. Otherwise denies any other acute complaints. States that she wants to go home today. Able to walk down the [place], feeling stronger.     MEDICATIONS  (STANDING):  enoxaparin Injectable 40 milliGRAM(s) SubCutaneous daily  lactated ringers. 1000 milliLiter(s) (100 mL/Hr) IV Continuous <Continuous>    MEDICATIONS  (PRN):  acetaminophen   Tablet .. 650 milliGRAM(s) Oral every 6 hours PRN Mild Pain (1 - 3), Moderate Pain (4 - 6), Severe Pain (7 - 10)      Vital Signs Last 24 Hrs  T(C): 36.7 (24 Jul 2021 10:48), Max: 36.9 (23 Jul 2021 22:33)  T(F): 98.1 (24 Jul 2021 10:48), Max: 98.4 (23 Jul 2021 22:33)  HR: 81 (24 Jul 2021 10:48) (65 - 88)  BP: 92/57 (24 Jul 2021 10:48) (92/57 - 107/70)  BP(mean): --  RR: 18 (24 Jul 2021 10:48) (16 - 18)  SpO2: 100% (24 Jul 2021 10:48) (98% - 100%)  CAPILLARY BLOOD GLUCOSE        PHYSICAL EXAM:  CONSTITUTIONAL: NAD  RESPIRATORY: Normal respiratory effort; lungs are clear to auscultation bilaterally  CARDIOVASCULAR: Regular rate and rhythm, normal S1 and S2, no murmur/rub/gallop; No lower extremity edema; Peripheral pulses are 2+ bilaterally  ABDOMEN: Nontender to palpation, normoactive bowel sounds, no rebound/guarding; No hepatosplenomegaly  MUSCLOSKELETAL: no clubbing or cyanosis of digits; no joint swelling or tenderness to palpation  PSYCH: A+O to person, place, and time; affect appropriate  NEURO: AAOx 3, speech fluent ,sensation intact, BLE- strength 5/5  SKIN: no rash     LABS:                        9.9    3.83  )-----------( 201 ( 24 Jul 2021 07:46 )             33.3     07-24    136  |  100  |  10  ----------------------------<  75  3.9   |  24  |  0.55    Ca    9.1      24 Jul 2021 07:46  Phos  3.5     07-24  Mg     2.00     07-24                RADIOLOGY & ADDITIONAL TESTS:

## 2021-07-24 NOTE — DISCHARGE NOTE PROVIDER - NSDCCPCAREPLAN_GEN_ALL_CORE_FT
PRINCIPAL DISCHARGE DIAGNOSIS  Diagnosis: Ataxia  Assessment and Plan of Treatment: You presented with fatigue and unsteady gait. Your head CT and MRI showed no acute findings. Your MRI of the spine was also negative. You were evaluated by the neurology team and underwent a lumbar puncture procedure in the emergency department. The results did not show any bacterial or infectious processes. One of your lab values came back positive (Gq1b antibody). 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 A few other labwork is still pending (Oligoclonal Ab and Lyme studies) - these can be followed as outpatient with neurology. Follow up with Dr. King for further nerve conduction test within 2 weeks of discharge. CALL  [phone] FOR AN APPOINTMENT. Address: [address] [place] [address]. [place] [address].

## 2021-07-24 NOTE — PROGRESS NOTE ADULT - PROBLEM SELECTOR PLAN 1
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

## 2021-07-24 NOTE — DISCHARGE NOTE PROVIDER - HOSPITAL COURSE
22 y/o female with no PMHx, who presented with lightheadedness, fatigue, and unsteady gait x3 days.     1. Ataxia: Seen by neurology, no recent preceding signs or symptoms of acute infection, GI upset, or gastrointestinal illness by history. CTH neg. MRI brain and spine unremarkable. Underwent LP in ED with cell count not consistent with bacterial/infectious process. 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 Received 4 doses of IVIG. Oligoclonal Ab and Lyme studies still pending - can be followed as outpatient with neurology. Can f/u with Dr. King for further nerve conduction test within 2 weeks of discharge [phone] at [address] [place] [address].     2. Lightheadness: Course c/b complaints of lightheadedness since illness onset. Orthostatic positive, s/p IVF with improvement in BP and symptoms. Able to ambulate to the bathroom and back with assist, outpatient PT.     3. Pancytopenia: Completed her menstrual cycle 7/19. Dad reports he has history of Alpha thal trait . Resolved/stable. Outpatient f/u.     Patient seen and evaluated, no acute somatic complaints. Reviewed discharge medications with patient; All new medications requiring new prescriptions were sent to the pharmacy of patient's choice. Reviewed need for prescription for previous home medications and new prescriptions sent if requested. Medically cleared/stable for discharge as per Dr. Moreno with close outpatient follow up within 1-2 weeks of discharge. Patient understands and agrees with plan of care.

## 2021-07-24 NOTE — PROGRESS NOTE ADULT - PROBLEM SELECTOR PLAN 3
with Anemia   Completed her menstrual cycle this Monday   Denies bleeding from any site. Denies melena  Iron labs WNL   continue to monitor  Dad reports he has history of Alpha thal trait . He is not sure about pt - can f/u thalassemia w/u outpatient

## 2021-07-24 NOTE — PROGRESS NOTE ADULT - REASON FOR ADMISSION
Light headedness, fatigue, weakness, ataxia

## 2021-07-26 LAB — B BURGDOR DNA SPEC QL NAA+PROBE: NEGATIVE — SIGNIFICANT CHANGE UP

## 2021-07-30 LAB — GANGLIOSIDE GQ1B IGG ANTIBODY RESULT: HIGH TITER

## 2022-12-17 NOTE — ED ADULT NURSE NOTE - CCCP TRG CHIEF CMPLNT
Implemented All Universal Safety Interventions:  [place] to call system. Call bell, personal items and telephone within reach. Instruct patient to call for assistance. Room bathroom lighting operational. Non-slip footwear when patient is off stretcher. Physically safe environment: no spills, clutter or unnecessary equipment. Stretcher in lowest position, wheels locked, appropriate side rails in place.
dizziness